# Patient Record
Sex: MALE | Race: WHITE | NOT HISPANIC OR LATINO | Employment: OTHER | URBAN - METROPOLITAN AREA
[De-identification: names, ages, dates, MRNs, and addresses within clinical notes are randomized per-mention and may not be internally consistent; named-entity substitution may affect disease eponyms.]

---

## 2024-10-08 ENCOUNTER — APPOINTMENT (OUTPATIENT)
Age: 84
End: 2024-10-08
Payer: MEDICARE

## 2024-10-08 ENCOUNTER — OFFICE VISIT (OUTPATIENT)
Age: 84
End: 2024-10-08
Payer: MEDICARE

## 2024-10-08 VITALS
WEIGHT: 180.8 LBS | HEIGHT: 72 IN | OXYGEN SATURATION: 96 % | DIASTOLIC BLOOD PRESSURE: 60 MMHG | TEMPERATURE: 98 F | BODY MASS INDEX: 24.49 KG/M2 | SYSTOLIC BLOOD PRESSURE: 112 MMHG | RESPIRATION RATE: 17 BRPM | HEART RATE: 73 BPM

## 2024-10-08 DIAGNOSIS — Z76.89 ENCOUNTER TO ESTABLISH CARE: Primary | ICD-10-CM

## 2024-10-08 DIAGNOSIS — M17.11 ARTHRITIS OF RIGHT KNEE: ICD-10-CM

## 2024-10-08 DIAGNOSIS — J44.9 CHRONIC OBSTRUCTIVE PULMONARY DISEASE, UNSPECIFIED COPD TYPE (HCC): ICD-10-CM

## 2024-10-08 DIAGNOSIS — L60.2 OVERGROWN TOENAILS: ICD-10-CM

## 2024-10-08 DIAGNOSIS — C61 PROSTATE CANCER (HCC): ICD-10-CM

## 2024-10-08 DIAGNOSIS — M51.362 DEGENERATION OF INTERVERTEBRAL DISC OF LUMBAR REGION WITH DISCOGENIC BACK PAIN AND LOWER EXTREMITY PAIN: ICD-10-CM

## 2024-10-08 DIAGNOSIS — R51.9 ACUTE NONINTRACTABLE HEADACHE, UNSPECIFIED HEADACHE TYPE: ICD-10-CM

## 2024-10-08 DIAGNOSIS — C19 COLORECTAL CANCER (HCC): ICD-10-CM

## 2024-10-08 DIAGNOSIS — I10 PRIMARY HYPERTENSION: ICD-10-CM

## 2024-10-08 DIAGNOSIS — R06.09 DYSPNEA ON EXERTION: ICD-10-CM

## 2024-10-08 DIAGNOSIS — Z23 ENCOUNTER FOR IMMUNIZATION: ICD-10-CM

## 2024-10-08 PROBLEM — Z15.09 BRCA1 GENE MUTATION POSITIVE: Status: ACTIVE | Noted: 2024-10-08

## 2024-10-08 PROBLEM — H35.30 MACULAR DEGENERATION: Status: ACTIVE | Noted: 2024-10-08

## 2024-10-08 PROBLEM — M81.0 OSTEOPOROSIS: Status: ACTIVE | Noted: 2024-10-08

## 2024-10-08 PROBLEM — Z90.49 HISTORY OF COLON RESECTION: Status: ACTIVE | Noted: 2024-10-08

## 2024-10-08 PROBLEM — E78.2 MIXED HYPERLIPIDEMIA: Status: ACTIVE | Noted: 2024-09-06

## 2024-10-08 PROBLEM — G47.33 OBSTRUCTIVE SLEEP APNEA SYNDROME: Status: ACTIVE | Noted: 2024-10-08

## 2024-10-08 PROBLEM — Z15.01 BRCA1 GENE MUTATION POSITIVE: Status: ACTIVE | Noted: 2024-10-08

## 2024-10-08 PROBLEM — C43.30 MALIGNANT MELANOMA OF FACE EXCLUDING EYELID, NOSE, LIP, AND EAR (HCC): Status: ACTIVE | Noted: 2024-10-08

## 2024-10-08 PROBLEM — K21.9 GASTROESOPHAGEAL REFLUX DISEASE: Status: ACTIVE | Noted: 2024-10-08

## 2024-10-08 PROCEDURE — 99204 OFFICE O/P NEW MOD 45 MIN: CPT | Performed by: STUDENT IN AN ORGANIZED HEALTH CARE EDUCATION/TRAINING PROGRAM

## 2024-10-08 PROCEDURE — 73562 X-RAY EXAM OF KNEE 3: CPT

## 2024-10-08 PROCEDURE — 90662 IIV NO PRSV INCREASED AG IM: CPT | Performed by: STUDENT IN AN ORGANIZED HEALTH CARE EDUCATION/TRAINING PROGRAM

## 2024-10-08 PROCEDURE — G0008 ADMIN INFLUENZA VIRUS VAC: HCPCS | Performed by: STUDENT IN AN ORGANIZED HEALTH CARE EDUCATION/TRAINING PROGRAM

## 2024-10-08 RX ORDER — LORATADINE 10 MG/1
10 TABLET ORAL
COMMUNITY
Start: 2024-05-16

## 2024-10-08 RX ORDER — GABAPENTIN 100 MG/1
300 CAPSULE ORAL
COMMUNITY
Start: 2024-08-06

## 2024-10-08 RX ORDER — AMLODIPINE BESYLATE 2.5 MG/1
2.5 TABLET ORAL DAILY
COMMUNITY

## 2024-10-08 RX ORDER — FLUTICASONE PROPIONATE 50 MCG
2 SPRAY, SUSPENSION (ML) NASAL DAILY
COMMUNITY

## 2024-10-08 RX ORDER — METOPROLOL SUCCINATE 25 MG/1
TABLET, EXTENDED RELEASE ORAL
COMMUNITY

## 2024-10-08 RX ORDER — ROSUVASTATIN CALCIUM 10 MG/1
10 TABLET, COATED ORAL
COMMUNITY
Start: 2024-05-16

## 2024-10-08 RX ORDER — ASPIRIN 81 MG/1
81 TABLET ORAL DAILY
COMMUNITY
Start: 2024-08-12

## 2024-10-08 RX ORDER — GUAIFENESIN 600 MG/1
1 TABLET, EXTENDED RELEASE ORAL 2 TIMES DAILY
COMMUNITY
Start: 2024-05-16

## 2024-10-08 RX ORDER — LOSARTAN POTASSIUM 25 MG/1
25 TABLET ORAL
COMMUNITY
Start: 2024-05-16

## 2024-10-08 NOTE — PROGRESS NOTES
Ambulatory Visit  Name: Dante Mckinney      : 1940      MRN: [id number]  Encounter Provider: Severino Hilton MD  Encounter Date: 10/8/2024   Encounter department: [facility] [place]    Assessment & Plan  Encounter to establish care  Immunizations: Influenza vaccine given today; RSV vaccine recommended  Labs: CMP for MRI w wo contrast; patient reports [facility] - he will send reports       Degeneration of intervertebral disc of lumbar region with discogenic back pain and lower extremity pain  Reviewed patient's MRI LS results that he brought with him. Was previously getting injections with Pain Medicine. Refer to Spine and Pain management.  Orders:    Ambulatory referral to Spine & Pain Management; Future    Acute nonintractable headache, unspecified headache type  New onset headache for the past week. Lower occipital headache that is waking him up at night. Given new onset headache in elderly waking him up at night, will obtain MRI brain. CMP to be done prior to MRI brain to check kidney function.  Orders:    MRI brain w wo contrast; Future    Comprehensive metabolic panel; Future    Chronic obstructive pulmonary disease, unspecified COPD type (HCC)  Patient uses O2 at night. He does not use inhalers. He is complaining of MASCORRO but declines even rescue inhaler.       Dyspnea on exertion  Given history of COPD, suspect most likely related. Offered patient inhalers, including rescue inhaler, but he declines as he states MASCORRO improves with rest. He had stress test done in [place] prior to moving. He was started on ASA and advised to see Cardiologist upon moving here. He saw [facility] Cardiologist who looked at stress test report and saw no ischemia. No comments made on whether he should continue ASA or discontinue. He has follow up in December. For now, will continue ASA and await Cardiology recommendations. Also on beta blocker and statin. Will continue for  now until patient sees Cardiology again.       Primary hypertension  BP well controlled. Continue amlodipine 2.5 mg qd, losartan 25 mg qd, Toprol XL 25 mg qd.       Arthritis of right knee  Chronic. Patient reports that he was told he would need surgery.  Obtain updated R knee XR  Continue tylenol and trial voltaren gel  Refer to Orthopedics    Orders:    Ambulatory Referral to Orthopedic Surgery; Future    XR knee 3 vw right non injury; Future    Diclofenac Sodium (VOLTAREN) 1 %; Apply 2 g topically 4 (four) times a day    Prostate cancer (HCC)  S/p partial resection. Stable.       Colorectal cancer (HCC)  S/p partial colon resection. Stable.       Overgrown toenails  Has some sores on the lateral aspect of the 4th toe of the left foot and medial aspect of the 5th toe as well. Patient reports he has not cut his toenails in years. Refer to Podiatry.    Orders:    Ambulatory Referral to Podiatry; Future    Encounter for immunization    Orders:    influenza vaccine, high-dose, PF 0.5 mL (Fluzone High Dose)      Depression Screening and Follow-up Plan: Patient was screened for depression during today's encounter. They screened negative with a PHQ-2 score of 0.      History of Present Illness     Dante Mckinney is an 85 yo M with PMH of colon cancer s/p resection, COPD, AURE on PCAP and O2, osteoarthritis, prostate cancer s/p partial resection, bilateral macular degeneration, and HTN who presents today to establish care. Patient recently moved from [place]. [facility] Patient reports having labs done just last week and will get report sent to us. He also comes with some previous office visit/procedure notes and results of MRI of lumbar spine, which were reviewed today. He has history of lumbar spine degenerative disease for which he was following with Pain Medicine and got injections in the past. Also has history of bilateral knee OA. Has had knee surgery on the left, but not yet on the right. He uses  "CPAP and O2 at night. He reports he gets MASCORRO. Prior to leaving [place], he was sent for stress test. He was started on aspirin at that time. He saw [facility] Cardiologist last month who read the stress test report as having no ischemia. He was not told to d/c ASA but has follow up in December. They felt maybe his symptoms were due to lung disease. Patient declines need for inhaler at this time. Over the last week, he has noticed lower occipital headache, aching pain with associated pain in the neck. It can be one or both sides. He also reports a \"wooshing\" sound in his right ear. The pain has been waking him up at night. No other focal neuro deficit other than numbness in his right lower leg that is chronic and associated with his knee OA. He denies blurry vision.          Review of Systems   Constitutional:  Negative for appetite change, chills and fever.   HENT:  Negative for congestion and sore throat.    Eyes:  Negative for visual disturbance.   Respiratory:  Negative for cough and shortness of breath.         Dyspnea on exertion   Cardiovascular:  Negative for chest pain and leg swelling.   Gastrointestinal:  Negative for abdominal pain, constipation, diarrhea and nausea.   Genitourinary:  Negative for difficulty urinating and dysuria.   Musculoskeletal:  Positive for arthralgias, gait problem and neck pain. Negative for myalgias.   Skin:  Negative for rash.   Neurological:  Positive for numbness and headaches. Negative for dizziness, weakness and light-headedness.   Psychiatric/Behavioral:  Positive for sleep disturbance. Negative for confusion.      Medical History Reviewed by provider this encounter:  Meds       Past Medical History   Past Medical History:   Diagnosis Date    Colorectal cancer (HCC)     Macular degeneration     both eyes    Melanoma in situ of cheek (HCC)      Past Surgical History:   Procedure Laterality Date    CATARACT EXTRACTION Bilateral     2015    COLONOSCOPY      LAPAROSCOPIC COLON " RESECTION      PROSTATE BIOPSY      2009 and 1994    REPLACEMENT TOTAL KNEE Left     2009    REPLACEMENT TOTAL KNEE Right     2016    TOTAL HIP ARTHROPLASTY      2021     Family History   Problem Relation Age of Onset    Cancer Mother     Heart disease Father     Hodgkin's lymphoma Brother     Colorectal Cancer Maternal Grandmother      Current Outpatient Medications on File Prior to Visit   Medication Sig Dispense Refill    amLODIPine (NORVASC) 2.5 mg tablet Take 2.5 mg by mouth daily      aspirin (ECOTRIN LOW STRENGTH) 81 mg EC tablet Take 81 mg by mouth daily      Calcium Carbonate Antacid 400 MG CHEW Chew      Cholecalciferol 100 MCG (4000 UT) TABS Take 2 tablets by mouth daily      fluticasone (FLONASE) 50 mcg/act nasal spray 2 sprays into each nostril daily      gabapentin (NEURONTIN) 100 mg capsule Take 300 mg by mouth daily at bedtime      guaiFENesin (MUCINEX) 600 mg 12 hr tablet Take 1 tablet by mouth 2 (two) times a day      loratadine (CLARITIN) 10 mg tablet Take 10 mg by mouth      losartan (COZAAR) 25 mg tablet Take 25 mg by mouth      metoprolol succinate (TOPROL-XL) 25 mg 24 hr tablet take 1/2 (one-half) tablet by mouth once daily      rosuvastatin (CRESTOR) 10 MG tablet Take 10 mg by mouth       No current facility-administered medications on file prior to visit.     Allergies   Allergen Reactions    Balance B-50 Dizziness    Omeprazole Other (See Comments)    Prednisone Other (See Comments)    Simvastatin Myalgia      Current Outpatient Medications on File Prior to Visit   Medication Sig Dispense Refill    amLODIPine (NORVASC) 2.5 mg tablet Take 2.5 mg by mouth daily      aspirin (ECOTRIN LOW STRENGTH) 81 mg EC tablet Take 81 mg by mouth daily      Calcium Carbonate Antacid 400 MG CHEW Chew      Cholecalciferol 100 MCG (4000 UT) TABS Take 2 tablets by mouth daily      fluticasone (FLONASE) 50 mcg/act nasal spray 2 sprays into each nostril daily      gabapentin (NEURONTIN) 100 mg capsule Take 300 mg  by mouth daily at bedtime      guaiFENesin (MUCINEX) 600 mg 12 hr tablet Take 1 tablet by mouth 2 (two) times a day      loratadine (CLARITIN) 10 mg tablet Take 10 mg by mouth      losartan (COZAAR) 25 mg tablet Take 25 mg by mouth      metoprolol succinate (TOPROL-XL) 25 mg 24 hr tablet take 1/2 (one-half) tablet by mouth once daily      rosuvastatin (CRESTOR) 10 MG tablet Take 10 mg by mouth       No current facility-administered medications on file prior to visit.      Social History     Tobacco Use    Smoking status: Former     Current packs/day: 0.00     Types: Cigarettes     Quit date: 10/8/1994     Years since quittin.0     Passive exposure: Never    Smokeless tobacco: Never   Vaping Use    Vaping status: Never Used   Substance and Sexual Activity    Alcohol use: Yes     Alcohol/week: 3.0 standard drinks of alcohol     Types: 3 Glasses of wine per week    Drug use: Not Currently    Sexual activity: Not Currently         Objective     /60 (BP Location: Right arm, Patient Position: Sitting, Cuff Size: Standard)   Pulse 73   Temp 98 °F (36.7 °C) (Tympanic)   Resp 17   Ht 6' (1.829 m)   Wt 82 kg (180 lb 12.8 oz)   SpO2 96%   BMI 24.52 kg/m²     Physical Exam  Constitutional:       General: He is not in acute distress.  HENT:      Right Ear: Tympanic membrane, ear canal and external ear normal.      Left Ear: Tympanic membrane, ear canal and external ear normal.      Ears:      Comments: Cerumen in left ear  Eyes:      Extraocular Movements: Extraocular movements intact.      Right eye: No nystagmus.      Left eye: No nystagmus.      Conjunctiva/sclera: Conjunctivae normal.   Cardiovascular:      Rate and Rhythm: Normal rate and regular rhythm.      Heart sounds: Normal heart sounds.   Pulmonary:      Effort: Pulmonary effort is normal.      Breath sounds: Normal breath sounds.   Abdominal:      General: There is no distension.      Tenderness: There is no abdominal tenderness.   Musculoskeletal:       Cervical back: Neck supple. No rigidity. No spinous process tenderness or muscular tenderness.      Right lower leg: No edema.      Left lower leg: No edema.   Feet:      Comments: Overgrown toenails seen on left foot exam; also with some sores on the sides of his 4th and 5th toes where they rub together  Skin:     General: Skin is warm and dry.      Comments: Seborrheic keratoses on bilateral forearms   Neurological:      Mental Status: He is alert.      Cranial Nerves: Cranial nerves 2-12 are intact.      Sensory: Sensation is intact.      Motor: Motor function is intact.      Coordination: Romberg sign positive.      Gait: Gait abnormal.   Psychiatric:         Speech: Speech normal.         Behavior: Behavior normal. Behavior is cooperative.

## 2024-10-08 NOTE — ASSESSMENT & PLAN NOTE
Patient uses O2 at night. He does not use inhalers. He is complaining of MASCORRO but declines even rescue inhaler.

## 2024-10-15 ENCOUNTER — TELEPHONE (OUTPATIENT)
Age: 84
End: 2024-10-15

## 2024-10-15 NOTE — TELEPHONE ENCOUNTER
----- Message from Severino Hilton MD sent at 10/15/2024 12:43 PM EDT -----  Please let patient know that his knee XR shows advanced arthritis as well as small amount of fluid in joint. He has appt with Ortho in 2 days

## 2024-10-17 ENCOUNTER — OFFICE VISIT (OUTPATIENT)
Dept: OBGYN CLINIC | Facility: CLINIC | Age: 84
End: 2024-10-17
Payer: MEDICARE

## 2024-10-17 VITALS
HEIGHT: 72 IN | HEART RATE: 83 BPM | WEIGHT: 187 LBS | BODY MASS INDEX: 25.33 KG/M2 | SYSTOLIC BLOOD PRESSURE: 116 MMHG | DIASTOLIC BLOOD PRESSURE: 67 MMHG

## 2024-10-17 DIAGNOSIS — E78.2 MIXED HYPERLIPIDEMIA: Primary | ICD-10-CM

## 2024-10-17 DIAGNOSIS — M17.11 ARTHRITIS OF RIGHT KNEE: ICD-10-CM

## 2024-10-17 PROCEDURE — 20610 DRAIN/INJ JOINT/BURSA W/O US: CPT | Performed by: ORTHOPAEDIC SURGERY

## 2024-10-17 PROCEDURE — 99203 OFFICE O/P NEW LOW 30 MIN: CPT | Performed by: ORTHOPAEDIC SURGERY

## 2024-10-17 RX ADMIN — BUPIVACAINE HYDROCHLORIDE 2 ML: 2.5 INJECTION, SOLUTION INFILTRATION; PERINEURAL at 14:30

## 2024-10-17 RX ADMIN — KETOROLAC TROMETHAMINE 60 MG: 30 INJECTION, SOLUTION INTRAMUSCULAR; INTRAVENOUS at 14:30

## 2024-10-17 NOTE — PROGRESS NOTES
Orthopaedics Office Visit - 1st Patient Visit    ASSESSMENT/PLAN:    Assessment:   Right knee severe osteoarthritis - chronic, exacerbated in recent months/year      Plan:   Discussed conservative treatment with patient at length  Weight bearing as tolerated right lower extremity   Begin to maintain hinged knee for support   Offered patient Toradol injection. Patient accepted and tolerated well.    Physician guided home exercise program provided for patient.   Over the counter analgesics as needed / directed   Ice / heat as directed   Follow up 8 weeks       To Do Next Visit:  Evaluate right knee pain     ____________________________________  CHIEF COMPLAINT:  Chief Complaint   Patient presents with    Right Knee - Pain     Referred by PCP. Xray in chart from 10/08/24. Symptoms for quite a while. Denies any recent falls, injuries, etc. Patient received a cortisone injection back in May, and notes minimal relief.         SUBJECTIVE:  Dante Mckinney is a 84 y.o. male who presents to the office for an initial evaluation for her right knee.  Patient states that he has been experiencing knee pain ongoing for the past few decades in duration.  Patient notes that over the past few months he has had increased pain with no trauma.  Patient states that his pain is generally constant in nature and becomes worse with standing from seated position, range of motion of the knee and weightbearing.  Patient has had cortisone injections in the past, most recently being in May.  Patient has had viscosupplementation injections in the past which have provided minimal relief of symptoms.  Patient states that he has not been icing or heating the knee.  Patient denies any instability in the knee.  Patient offers no other complaints at this time      PAST MEDICAL HISTORY:  Past Medical History:   Diagnosis Date    Cancer (HCC)     Colorectal cancer (HCC)     Macular degeneration     both eyes    Melanoma in situ of cheek (HCC)      Osteoarthritis        PAST SURGICAL HISTORY:  Past Surgical History:   Procedure Laterality Date    CATARACT EXTRACTION Bilateral     2015    COLONOSCOPY      JOINT REPLACEMENT  2009    LAPAROSCOPIC COLON RESECTION      PROSTATE BIOPSY       and     REPLACEMENT TOTAL KNEE Left     2009    REPLACEMENT TOTAL KNEE Right     2016    TOTAL HIP ARTHROPLASTY             FAMILY HISTORY:  Family History   Problem Relation Age of Onset    Cancer Mother     Heart disease Father     Hodgkin's lymphoma Brother     Cancer Brother     Colorectal Cancer Maternal Grandmother     Cancer Sister         Breast & Colon       SOCIAL HISTORY:  Social History     Tobacco Use    Smoking status: Former     Current packs/day: 0.00     Average packs/day: 0.5 packs/day for 30.7 years (15.4 ttl pk-yrs)     Types: Cigarettes     Start date: 1964     Quit date: 10/8/1994     Years since quittin.0     Passive exposure: Never    Smokeless tobacco: Never   Vaping Use    Vaping status: Never Used   Substance Use Topics    Alcohol use: Yes     Alcohol/week: 40.0 standard drinks of alcohol     Types: 40 Cans of beer per week    Drug use: Never       MEDICATIONS:    Current Outpatient Medications:     amLODIPine (NORVASC) 2.5 mg tablet, Take 2.5 mg by mouth daily, Disp: , Rfl:     aspirin (ECOTRIN LOW STRENGTH) 81 mg EC tablet, Take 81 mg by mouth daily, Disp: , Rfl:     Calcium Carbonate Antacid 400 MG CHEW, Chew, Disp: , Rfl:     Cholecalciferol 100 MCG (4000 UT) TABS, Take 2 tablets by mouth daily, Disp: , Rfl:     Diclofenac Sodium (VOLTAREN) 1 %, Apply 2 g topically 4 (four) times a day, Disp: 50 g, Rfl: 1    fluticasone (FLONASE) 50 mcg/act nasal spray, 2 sprays into each nostril daily, Disp: , Rfl:     gabapentin (NEURONTIN) 100 mg capsule, Take 300 mg by mouth daily at bedtime, Disp: , Rfl:     guaiFENesin (MUCINEX) 600 mg 12 hr tablet, Take 1 tablet by mouth 2 (two) times a day, Disp: , Rfl:     loratadine (CLARITIN) 10  "mg tablet, Take 10 mg by mouth, Disp: , Rfl:     losartan (COZAAR) 25 mg tablet, Take 25 mg by mouth, Disp: , Rfl:     metoprolol succinate (TOPROL-XL) 25 mg 24 hr tablet, take 1/2 (one-half) tablet by mouth once daily, Disp: , Rfl:     rosuvastatin (CRESTOR) 10 MG tablet, Take 10 mg by mouth, Disp: , Rfl:     ALLERGIES:  Allergies   Allergen Reactions    Balance B-50 Dizziness    Omeprazole Other (See Comments)    Prednisone Other (See Comments)    Simvastatin Myalgia       LABS:  HgA1c: No results found for: \"HGBA1C\"  BMP: No results found for: \"GLUCOSE\", \"CALCIUM\", \"NA\", \"K\", \"CO2\", \"CL\", \"BUN\", \"CREATININE\"  CBC: No components found for: \"CBC\"    _____________________________________________________  PHYSICAL EXAMINATION:  Vital signs: Ht 6' (1.829 m)   Wt 84.8 kg (187 lb)   BMI 25.36 kg/m²   General: No acute distress, awake and alert  Psychiatric: Mood and affect appear appropriate  HEENT: Trachea Midline, No torticollis, no apparent facial trauma  Cardiovascular: No audible murmurs; Extremities appear perfused  Pulmonary: No audible wheezing or stridor  Skin: No open lesions; see further details (if any) below      MUSCULOSKELETAL EXAMINATION:  Right knee examination :  Patient sitting comfortably in the office in no apparent distress   No acute visible abnormalities present in the right knee.  Extremity appears well-perfused overall.  Tenderness to palpation noted over the medial joint line.  No other bony or soft tissue tenderness to palpation noted at this time  0 to 100 degrees of range of motion of the knee appreciated with peripatellar crepitus noted  NV intact    _____________________________________________________  STUDIES REVIEWED:  I personally reviewed the images obtained ON 10/8/24 and my independent interpretation is as follows:  Right knee XR 3 views :  IMPRESSION:  No acute osseous abnormality.  Advanced degenerative changes of the knee, most prominent in the medial " "compartment.        PROCEDURES PERFORMED:  Large joint arthrocentesis: R knee  [place] Protocol:  procedure performed by consultantConsent: Verbal consent obtained.  Risks and benefits: risks, benefits and alternatives were discussed  Consent given by: patient  Patient understanding: patient states understanding of the procedure being performed  Site marked: the operative site was marked  Patient identity confirmed: verbally with patient  Supporting Documentation  Indications: pain   Procedure Details  Location: knee - R knee  Preparation: Patient was prepped and draped in the usual sterile fashion  Needle size: 22 G  Ultrasound guidance: no  Approach: anterolateral  Medications administered: 2 mL bupivacaine 0.25 %; 60 mg ketorolac 60 mg/2 mL    Patient tolerance: patient tolerated the procedure well with no immediate complications  Dressing:  Sterile dressing applied                             Willis Foster PA-C - assisting  Pedro Beavers MD                                  Portions of the record may have been created with voice recognition software.  Occasional wrong word or \"sound a like\" substitutions may have occurred due to the inherent limitations of voice recognition software.  Read the chart carefully and recognize, using context, where substitutions have occurred.    "

## 2024-10-17 NOTE — PATIENT INSTRUCTIONS
Discussed conservative treatment with patient at length  Weight bearing as tolerated right lower extremity   Begin to maintain hinged knee for support   Offered patient Toradol injection. Patient accepted and tolerated well.    Physician guided home exercise program provided for patient.   Over the counter analgesics as needed / directed   Ice / heat as directed   Follow up 8 weeks

## 2024-10-18 RX ORDER — KETOROLAC TROMETHAMINE 30 MG/ML
60 INJECTION, SOLUTION INTRAMUSCULAR; INTRAVENOUS
Status: COMPLETED | OUTPATIENT
Start: 2024-10-17 | End: 2024-10-17

## 2024-10-18 RX ORDER — BUPIVACAINE HYDROCHLORIDE 2.5 MG/ML
2 INJECTION, SOLUTION INFILTRATION; PERINEURAL
Status: COMPLETED | OUTPATIENT
Start: 2024-10-17 | End: 2024-10-17

## 2024-11-02 ENCOUNTER — HOSPITAL ENCOUNTER (OUTPATIENT)
Dept: MRI IMAGING | Facility: HOSPITAL | Age: 84
Discharge: HOME | End: 2024-11-02
Attending: STUDENT IN AN ORGANIZED HEALTH CARE EDUCATION/TRAINING PROGRAM
Payer: MEDICARE

## 2024-11-02 DIAGNOSIS — R51.9 ACUTE NONINTRACTABLE HEADACHE, UNSPECIFIED HEADACHE TYPE: ICD-10-CM

## 2024-11-02 PROCEDURE — A9585 GADOBUTROL INJECTION: HCPCS | Performed by: STUDENT IN AN ORGANIZED HEALTH CARE EDUCATION/TRAINING PROGRAM

## 2024-11-02 PROCEDURE — 70553 MRI BRAIN STEM W/O & W/DYE: CPT

## 2024-11-02 RX ORDER — GADOBUTROL 604.72 MG/ML
8 INJECTION INTRAVENOUS
Status: COMPLETED | OUTPATIENT
Start: 2024-11-02 | End: 2024-11-02

## 2024-11-02 RX ADMIN — GADOBUTROL 8 ML: 604.72 INJECTION INTRAVENOUS at 10:32

## 2024-11-04 ENCOUNTER — TELEPHONE (OUTPATIENT)
Age: 84
End: 2024-11-04

## 2024-11-04 NOTE — TELEPHONE ENCOUNTER
----- Message from Severino Hilton MD sent at 11/4/2024  3:43 PM EST -----  Please let patient know that his MRI shows some mild chronic vascular disease as well as likely a small microbleed that may have happened in the past. No major findings to suggest why he is having the headaches. How are his headaches?

## 2024-11-04 NOTE — TELEPHONE ENCOUNTER
Can treat with tylenol PRN for now and watch out for any focal neurologic symptoms like changes in vision, weakness in particular part of the body, slurred speech, or imbalance

## 2024-11-04 NOTE — TELEPHONE ENCOUNTER
Asked questions to patient as per provider message. Patient expressed understanding and had the following response: Patient states his headaches are mild and comes and goes.   Please advise.

## 2024-11-08 ENCOUNTER — TELEPHONE (OUTPATIENT)
Dept: OBGYN CLINIC | Facility: CLINIC | Age: 84
End: 2024-11-08

## 2024-11-08 NOTE — PROGRESS NOTES
Assessment:  1. Spinal stenosis of lumbar region, unspecified whether neurogenic claudication present    2. Lumbar radiculopathy    3. Lumbar spondylosis    4. Right foot drop    5. Sacroiliitis (HCC)        Plan:  Orders Placed This Encounter   Procedures    Durable Medical Equipment     RIGHT FOOT MAFO for drop foot    FL spine and pain procedure     Standing Status:   Future     Standing Expiration Date:   11/11/2028     Order Specific Question:   Reason for Exam:     Answer:   right SI joint injection - OK TO DB     Order Specific Question:   Anticoagulant hold needed?     Answer:   no       No orders of the defined types were placed in this encounter.      My impressions and treatment recommendations were discussed in detail with the patient, who verbalized understanding and had no further questions.    84-year-old male presents her office chief complaint of right lower back pain as well as right leg pain below the knee.  Pain does not run continuously from the back down the entire right lower extremity.  Pain does not also appear to be subjectively linked between the back and the leg.      Exam notable for tenderness to palpation over the right SI joint with pain with provocative maneuvers as noted below.  He may have component of sacroiliitis and we discussed right SI joint injection under fluoroscopic guidance as a diagnostic and potentially therapeutic measure.      Differential diagnosis for his right leg pain includes peroneal neuropathy versus lumbar radiculopathy.  He has notable foot drop on the right which she reports is a chronic issue.  He does have MRI report of severe L5 foraminal stenosis on the right.  However he is not complaining of very clear L5 radicular pattern to his pain.  Therefore differential diagnosis is includes peroneal neuropathy.  I am going to order MAFO for the right foot.    If SI joint injection is ineffective, then may consider TFESI under fluoroscopic guidance. He reports  having back injection 10 years ago        [place] Prescription Drug Monitoring Program report was reviewed and was appropriate     Complete risks and benefits including bleeding, infection, tissue reaction, nerve injury and allergic reaction were discussed. The approach was demonstrated using models and literature was provided. Verbal and written consent was obtained.     Discharge instructions were provided. I personally saw and examined the patient and I agree with the above discussed plan of care.    History of Present Illness:    Dante Mckinney is a 84 y.o. male who presents to [facility] for initial evaluation of the above stated pain complaints. The patient has a past medical and chronic pain history as outlined in the assessment section. He was referred by Severino Hilton MD  [address] [place] [address] [place] [id number] .    Is here with chief complaint of back and right leg pain for 6 months.  Undetermined cause.  Moderate to severe over the past month.  He has considerable pain with activity.  Pain is intermittent.  Shooting, numbness, sharp in nature.    The pain is increased with lying down, standing, walking, exercise.  Decreased with sitting.    He is new to the area.  He is here from [place].  He has MRI report from 8/14/2024.    No tobacco or marijuana use.  Not allergic to latex or contrast dye.    In the past oxycodone and tramadol have provided relief.  Currently using acetaminophen with relief.    He reports pain primarily in the Right SI region and sometimes numb in the area. He then has intermittent pain in the right leg whic his shooting and lasts for seconds at a time. It is usually felt below the knee and it is hard to pinpoint exactly which part of the leg is affected.     Review of Systems:    Review of Systems   Eyes:  Positive for visual disturbance.   Respiratory:  Positive for shortness of breath.    Cardiovascular:  Positive for leg swelling.    Musculoskeletal:  Positive for arthralgias.   Neurological:  Positive for numbness.           Past Medical History:   Diagnosis Date    Cancer (HCC)     Colorectal cancer (HCC)     Macular degeneration     both eyes    Melanoma in situ of cheek (HCC)     Osteoarthritis        Past Surgical History:   Procedure Laterality Date    CATARACT EXTRACTION Bilateral     2015    COLONOSCOPY      JOINT REPLACEMENT  2009    LAPAROSCOPIC COLON RESECTION      PROSTATE BIOPSY       and     REPLACEMENT TOTAL KNEE Left     2009    REPLACEMENT TOTAL KNEE Right     2016    TOTAL HIP ARTHROPLASTY             Family History   Problem Relation Age of Onset    Cancer Mother     Heart disease Father     Hodgkin's lymphoma Brother     Cancer Brother     Colorectal Cancer Maternal Grandmother     Cancer Sister         Breast & Colon       Social History     Occupational History    Not on file   Tobacco Use    Smoking status: Former     Current packs/day: 0.00     Average packs/day: 0.5 packs/day for 30.7 years (15.4 ttl pk-yrs)     Types: Cigarettes     Start date: 1964     Quit date: 10/8/1994     Years since quittin.1     Passive exposure: Never    Smokeless tobacco: Never   Vaping Use    Vaping status: Never Used   Substance and Sexual Activity    Alcohol use: Yes     Alcohol/week: 40.0 standard drinks of alcohol     Types: 40 Cans of beer per week    Drug use: Never    Sexual activity: Not Currently     Partners: Female     Birth control/protection: Male Sterilization         Current Outpatient Medications:     amLODIPine (NORVASC) 2.5 mg tablet, Take 2.5 mg by mouth daily, Disp: , Rfl:     aspirin (ECOTRIN LOW STRENGTH) 81 mg EC tablet, Take 81 mg by mouth daily, Disp: , Rfl:     Calcium Carbonate Antacid 400 MG CHEW, Chew, Disp: , Rfl:     Cholecalciferol 100 MCG (4000 UT) TABS, Take 2 tablets by mouth daily, Disp: , Rfl:     Diclofenac Sodium (VOLTAREN) 1 %, Apply 2 g topically 4 (four) times a day, Disp:  50 g, Rfl: 1    fluticasone (FLONASE) 50 mcg/act nasal spray, 2 sprays into each nostril daily, Disp: , Rfl:     gabapentin (NEURONTIN) 100 mg capsule, Take 300 mg by mouth daily at bedtime, Disp: , Rfl:     guaiFENesin (MUCINEX) 600 mg 12 hr tablet, Take 1 tablet by mouth 2 (two) times a day, Disp: , Rfl:     loratadine (CLARITIN) 10 mg tablet, Take 10 mg by mouth, Disp: , Rfl:     losartan (COZAAR) 25 mg tablet, Take 25 mg by mouth, Disp: , Rfl:     metoprolol succinate (TOPROL-XL) 25 mg 24 hr tablet, take 1/2 (one-half) tablet by mouth once daily, Disp: , Rfl:     rosuvastatin (CRESTOR) 10 MG tablet, Take 10 mg by mouth, Disp: , Rfl:     Allergies   Allergen Reactions    Balance B-50 Dizziness    Omeprazole Other (See Comments)    Prednisone Other (See Comments)    Simvastatin Myalgia       Physical Exam:    /65   Pulse 87   Ht 6' (1.829 m)   Wt 84.5 kg (186 lb 3.2 oz)   BMI 25.25 kg/m²     Constitutional: normal, well developed, well nourished, alert, in no distress and non-toxic and no overt pain behavior.  Eyes: anicteric  HEENT: grossly intact  Neck: supple, symmetric, trachea midline and no masses   Pulmonary:even and unlabored  Cardiovascular:No edema or pitting edema present  Skin:Normal without rashes or lesions and well hydrated  Psychiatric:Mood and affect appropriate  Neurologic:Cranial Nerves II-XII grossly intact  Musculoskeletal:normal    Lumbar Spine Exam    Appearance:  Normal lordosis  Palpation/Tenderness:  right sacroiliac joint tenderness  Sensory:  no sensory deficits noted  Motor Strength:  Left hip flexion:  5/5  Left hip extension:  5/5  Right hip flexion:  5/5  Right hip extension:  5/5  Left knee flexion:  5/5  Left knee extension:  5/5  Right knee flexion:  5/5  Right knee extension:  5/5  Left foot dorsiflexion:  5/5  Left foot plantar flexion:  5/5  Right foot dorsiflexion:  3/5  Right foot plantar flexion:  5/5  Reflexes:  Left Patellar:  1+   Right Patellar:  1+   Left  Achilles:  1+   Right Achilles:  1+   Special Tests:  Left Straight Leg Test:  negative  Right Straight Leg Test:  positive  Right Samuel's Maneuver:  positive  Right Gaenslen's Test:  positive  Right Pelvic Distraction Test:  positive      Imaging  FL spine and pain procedure    (Results Pending)       Orders Placed This Encounter   Procedures    Durable Medical Equipment    FL spine and pain procedure

## 2024-11-08 NOTE — H&P (VIEW-ONLY)
Assessment:  1. Spinal stenosis of lumbar region, unspecified whether neurogenic claudication present    2. Lumbar radiculopathy    3. Lumbar spondylosis    4. Right foot drop    5. Sacroiliitis (HCC)        Plan:  Orders Placed This Encounter   Procedures    Durable Medical Equipment     RIGHT FOOT MAFO for drop foot    FL spine and pain procedure     Standing Status:   Future     Standing Expiration Date:   11/11/2028     Order Specific Question:   Reason for Exam:     Answer:   right SI joint injection - OK TO DB     Order Specific Question:   Anticoagulant hold needed?     Answer:   no       No orders of the defined types were placed in this encounter.      My impressions and treatment recommendations were discussed in detail with the patient, who verbalized understanding and had no further questions.    84-year-old male presents her office chief complaint of right lower back pain as well as right leg pain below the knee.  Pain does not run continuously from the back down the entire right lower extremity.  Pain does not also appear to be subjectively linked between the back and the leg.      Exam notable for tenderness to palpation over the right SI joint with pain with provocative maneuvers as noted below.  He may have component of sacroiliitis and we discussed right SI joint injection under fluoroscopic guidance as a diagnostic and potentially therapeutic measure.      Differential diagnosis for his right leg pain includes peroneal neuropathy versus lumbar radiculopathy.  He has notable foot drop on the right which she reports is a chronic issue.  He does have MRI report of severe L5 foraminal stenosis on the right.  However he is not complaining of very clear L5 radicular pattern to his pain.  Therefore differential diagnosis is includes peroneal neuropathy.  I am going to order MAFO for the right foot.    If SI joint injection is ineffective, then may consider TFESI under fluoroscopic guidance. He reports  having back injection 10 years ago        [place] Prescription Drug Monitoring Program report was reviewed and was appropriate     Complete risks and benefits including bleeding, infection, tissue reaction, nerve injury and allergic reaction were discussed. The approach was demonstrated using models and literature was provided. Verbal and written consent was obtained.     Discharge instructions were provided. I personally saw and examined the patient and I agree with the above discussed plan of care.    History of Present Illness:    Dante Mckinney is a 84 y.o. male who presents to [facility] for initial evaluation of the above stated pain complaints. The patient has a past medical and chronic pain history as outlined in the assessment section. He was referred by Severino Hilton MD  [address] [place] [address] [place] [id number] .    Is here with chief complaint of back and right leg pain for 6 months.  Undetermined cause.  Moderate to severe over the past month.  He has considerable pain with activity.  Pain is intermittent.  Shooting, numbness, sharp in nature.    The pain is increased with lying down, standing, walking, exercise.  Decreased with sitting.    He is new to the area.  He is here from [place].  He has MRI report from 8/14/2024.    No tobacco or marijuana use.  Not allergic to latex or contrast dye.    In the past oxycodone and tramadol have provided relief.  Currently using acetaminophen with relief.    He reports pain primarily in the Right SI region and sometimes numb in the area. He then has intermittent pain in the right leg whic his shooting and lasts for seconds at a time. It is usually felt below the knee and it is hard to pinpoint exactly which part of the leg is affected.     Review of Systems:    Review of Systems   Eyes:  Positive for visual disturbance.   Respiratory:  Positive for shortness of breath.    Cardiovascular:  Positive for leg swelling.    Musculoskeletal:  Positive for arthralgias.   Neurological:  Positive for numbness.           Past Medical History:   Diagnosis Date    Cancer (HCC)     Colorectal cancer (HCC)     Macular degeneration     both eyes    Melanoma in situ of cheek (HCC)     Osteoarthritis        Past Surgical History:   Procedure Laterality Date    CATARACT EXTRACTION Bilateral     2015    COLONOSCOPY      JOINT REPLACEMENT  2009    LAPAROSCOPIC COLON RESECTION      PROSTATE BIOPSY       and     REPLACEMENT TOTAL KNEE Left     2009    REPLACEMENT TOTAL KNEE Right     2016    TOTAL HIP ARTHROPLASTY             Family History   Problem Relation Age of Onset    Cancer Mother     Heart disease Father     Hodgkin's lymphoma Brother     Cancer Brother     Colorectal Cancer Maternal Grandmother     Cancer Sister         Breast & Colon       Social History     Occupational History    Not on file   Tobacco Use    Smoking status: Former     Current packs/day: 0.00     Average packs/day: 0.5 packs/day for 30.7 years (15.4 ttl pk-yrs)     Types: Cigarettes     Start date: 1964     Quit date: 10/8/1994     Years since quittin.1     Passive exposure: Never    Smokeless tobacco: Never   Vaping Use    Vaping status: Never Used   Substance and Sexual Activity    Alcohol use: Yes     Alcohol/week: 40.0 standard drinks of alcohol     Types: 40 Cans of beer per week    Drug use: Never    Sexual activity: Not Currently     Partners: Female     Birth control/protection: Male Sterilization         Current Outpatient Medications:     amLODIPine (NORVASC) 2.5 mg tablet, Take 2.5 mg by mouth daily, Disp: , Rfl:     aspirin (ECOTRIN LOW STRENGTH) 81 mg EC tablet, Take 81 mg by mouth daily, Disp: , Rfl:     Calcium Carbonate Antacid 400 MG CHEW, Chew, Disp: , Rfl:     Cholecalciferol 100 MCG (4000 UT) TABS, Take 2 tablets by mouth daily, Disp: , Rfl:     Diclofenac Sodium (VOLTAREN) 1 %, Apply 2 g topically 4 (four) times a day, Disp:  50 g, Rfl: 1    fluticasone (FLONASE) 50 mcg/act nasal spray, 2 sprays into each nostril daily, Disp: , Rfl:     gabapentin (NEURONTIN) 100 mg capsule, Take 300 mg by mouth daily at bedtime, Disp: , Rfl:     guaiFENesin (MUCINEX) 600 mg 12 hr tablet, Take 1 tablet by mouth 2 (two) times a day, Disp: , Rfl:     loratadine (CLARITIN) 10 mg tablet, Take 10 mg by mouth, Disp: , Rfl:     losartan (COZAAR) 25 mg tablet, Take 25 mg by mouth, Disp: , Rfl:     metoprolol succinate (TOPROL-XL) 25 mg 24 hr tablet, take 1/2 (one-half) tablet by mouth once daily, Disp: , Rfl:     rosuvastatin (CRESTOR) 10 MG tablet, Take 10 mg by mouth, Disp: , Rfl:     Allergies   Allergen Reactions    Balance B-50 Dizziness    Omeprazole Other (See Comments)    Prednisone Other (See Comments)    Simvastatin Myalgia       Physical Exam:    /65   Pulse 87   Ht 6' (1.829 m)   Wt 84.5 kg (186 lb 3.2 oz)   BMI 25.25 kg/m²     Constitutional: normal, well developed, well nourished, alert, in no distress and non-toxic and no overt pain behavior.  Eyes: anicteric  HEENT: grossly intact  Neck: supple, symmetric, trachea midline and no masses   Pulmonary:even and unlabored  Cardiovascular:No edema or pitting edema present  Skin:Normal without rashes or lesions and well hydrated  Psychiatric:Mood and affect appropriate  Neurologic:Cranial Nerves II-XII grossly intact  Musculoskeletal:normal    Lumbar Spine Exam    Appearance:  Normal lordosis  Palpation/Tenderness:  right sacroiliac joint tenderness  Sensory:  no sensory deficits noted  Motor Strength:  Left hip flexion:  5/5  Left hip extension:  5/5  Right hip flexion:  5/5  Right hip extension:  5/5  Left knee flexion:  5/5  Left knee extension:  5/5  Right knee flexion:  5/5  Right knee extension:  5/5  Left foot dorsiflexion:  5/5  Left foot plantar flexion:  5/5  Right foot dorsiflexion:  3/5  Right foot plantar flexion:  5/5  Reflexes:  Left Patellar:  1+   Right Patellar:  1+   Left  Achilles:  1+   Right Achilles:  1+   Special Tests:  Left Straight Leg Test:  negative  Right Straight Leg Test:  positive  Right Samuel's Maneuver:  positive  Right Gaenslen's Test:  positive  Right Pelvic Distraction Test:  positive      Imaging  FL spine and pain procedure    (Results Pending)       Orders Placed This Encounter   Procedures    Durable Medical Equipment    FL spine and pain procedure

## 2024-11-11 ENCOUNTER — DOCUMENTATION (OUTPATIENT)
Dept: PAIN MEDICINE | Facility: CLINIC | Age: 84
End: 2024-11-11

## 2024-11-11 ENCOUNTER — PATIENT MESSAGE (OUTPATIENT)
Dept: RADIOLOGY | Facility: CLINIC | Age: 84
End: 2024-11-11

## 2024-11-11 ENCOUNTER — CONSULT (OUTPATIENT)
Dept: PAIN MEDICINE | Facility: CLINIC | Age: 84
End: 2024-11-11
Payer: MEDICARE

## 2024-11-11 VITALS
HEIGHT: 72 IN | SYSTOLIC BLOOD PRESSURE: 140 MMHG | WEIGHT: 186.2 LBS | HEART RATE: 87 BPM | BODY MASS INDEX: 25.22 KG/M2 | DIASTOLIC BLOOD PRESSURE: 65 MMHG

## 2024-11-11 DIAGNOSIS — M54.16 LUMBAR RADICULOPATHY: ICD-10-CM

## 2024-11-11 DIAGNOSIS — M47.816 LUMBAR SPONDYLOSIS: ICD-10-CM

## 2024-11-11 DIAGNOSIS — M48.061 SPINAL STENOSIS OF LUMBAR REGION, UNSPECIFIED WHETHER NEUROGENIC CLAUDICATION PRESENT: Primary | ICD-10-CM

## 2024-11-11 DIAGNOSIS — M46.1 SACROILIITIS (HCC): ICD-10-CM

## 2024-11-11 DIAGNOSIS — M21.371 RIGHT FOOT DROP: ICD-10-CM

## 2024-11-11 PROCEDURE — 99204 OFFICE O/P NEW MOD 45 MIN: CPT | Performed by: STUDENT IN AN ORGANIZED HEALTH CARE EDUCATION/TRAINING PROGRAM

## 2024-11-11 NOTE — PATIENT COMMUNICATION
Pt is scheduled for SI Joint Injection with Dr Simon on 11/19/24    Pt is not diabetic and injection type does not require medication holds    Pt given instructions in office and via myc message    Have you completed PT/HEP/Chiro in the past 6 months for dedicated area? Per chart, pt has used meds for pain relief  If yes, how long did you complete?  What was the frequency?  Did it provide relief?  If no, reason therapy was not completed?

## 2024-11-11 NOTE — PATIENT INSTRUCTIONS
"Patient Education     Epidural injection   The Basics   Written by the doctors and editors at [facility]   What is an epidural injection? -- An epidural injection can be used to treat a condition called \"radiculopathy.\" This is the medical term for the pain, weakness, numbness, or tingling that happens when nerves coming from the spinal cord get pinched or damaged.  The doctor injects medicines into the space outside the covering of the spinal cord (figure 1). This is similar to an \"epidural\" that is used for pain relief during labor and childbirth.  Epidural injections can be given into different parts of your back:   Cervical epidural injection - Used to help with pain in the head or arms.   Thoracic epidural injection - Used to help with pain in the upper or middle back.   Lumbar epidural injection - Used to help with pain in the lower back or legs.  How do I prepare for an epidural injection? -- The doctor or nurse will tell you if you need to do anything special to prepare. Before your procedure, your doctor will do an exam. They might send you to get tests, such as:   X-ray, ultrasound, or other imaging tests - Imaging tests create pictures of the inside of the body.  Your doctor will also ask you about your \"health history.\" This involves asking you questions about any health problems you have or had in the past, past surgeries, and any medicines you take. Tell them about:   Any medicines you are taking - This includes any prescription or \"over-the-counter\" medicines you use, plus any herbal supplements you take. It helps to write down and bring a list of any medicines you take, or bring a bag with all of your medicines with you.   Any allergies you have   Any bleeding problems you have - Certain medicines, including some herbs and supplements, can increase the risk of bleeding. Some health conditions also increase this risk.  You will also get information about:   Eating and drinking before your procedure - In " "some cases, you might need to \"fast\" before surgery. This means not eating or drinking anything for a period of time. In other cases, you might be allowed to have liquids until a short time before the procedure. Whether you need to fast, and for how long, depends on the procedure you are having.   What help you will need when you go home - For example, you might need to have someone else bring you home or stay with you for some time while you recover.  Ask the doctor or nurse if you have questions or if there is anything you do not understand.  What happens during an epidural injection? -- When it is time for the procedure:   You might get an \"IV,\" which is a thin tube that goes into a vein. This can be used to give you fluids and medicines.   You will get anesthesia medicines to numb the area where the doctor will give the injection. This is to make sure that you do not feel pain during the procedure. You might also get medicines to make you relax and feel sleepy, called \"sedatives.\"   The doctors and nurses will monitor your breathing, blood pressure, and heart rate during the procedure.   The doctor might use a continuous X-ray called \"fluoroscopy.\" This is to help make sure that the medicines are injected into the right place. The doctor might also inject a dye to see where to give the medicine.   The doctor will place a needle through your skin and inject the medicine into a space near your spine. Then, they will remove the needle and cover the area with a clean bandage.   The procedure takes 15 to 30 minutes.  What happens after an epidural injection? -- After your procedure, the staff will watch you closely for a short time. It might take a few days before you feel the effects of the epidural injection.  Before you go home, make sure that you know what problems to look out for and when to call the doctor. Make sure that you understand your doctor's or nurse's instructions. Ask questions about anything you do " "not understand.  For the rest of the day after your procedure:   Try to rest. Limit activities like exercise or driving.   The doctor might recommend an over-the-counter pain medicine. These include acetaminophen (sample brand name: Tylenol), ibuprofen (sample brand names: Advil, Motrin), and naproxen (sample brand name: Aleve).   Ice can help with pain and swelling. Put a cold gel pack, bag of ice, or bag of frozen vegetables on the injection site area every 1 to 2 hours, for 15 minutes each time. Put a thin towel between the ice (or other cold object) and the skin.  What are the risks of an epidural injection? -- Your doctor will talk to you about all of the possible risks, and answer your questions. Possible risks include:   Bleeding   Infection   Headache   Nerve injury  When should I call the doctor? -- Call for emergency help right away (in the [place] and [place], call 9-1-1) if:   You can't move your arms or legs.  Call for advice if:   You have a fever of 100.4°F (38°C) or higher, or chills.   You have redness or swelling around the injection site.   You have a headache.   Your arms or legs are numb, weak, or tingly.  All topics are updated as new evidence becomes available and our peer review process is complete.  This topic retrieved from [employer] on: May 15, 2024.  Topic 329581 Version 1.0  Release: 32.4.3 - C32.134  © 2024 UpToDate, Inc. and/or its affiliates. All rights reserved.  figure 1: Epidural injection     Duringan epidural injection, the doctor inserts a needle between 2 of the bones thatmake up the spine. Then, they inject medicines into the area around the spinalcord. This is an illustration of a \"lumbar\" epidural injection, which is given into the low back. The doctor can place the needle in other areas to treat other types of pain.  Graphic 999172 Version 1.0  Consumer Information Use and Disclaimer   Disclaimer: This generalized information is a limited summary of diagnosis, treatment, and/or " medication information. It is not meant to be comprehensive and should be used as a tool to help the user understand and/or assess potential diagnostic and treatment options. It does NOT include all information about conditions, treatments, medications, side effects, or risks that may apply to a specific patient. It is not intended to be medical advice or a substitute for the medical advice, diagnosis, or treatment of a health care provider based on the health care provider's examination and assessment of a patient's specific and unique circumstances. Patients must speak with a health care provider for complete information about their health, medical questions, and treatment options, including any risks or benefits regarding use of medications. This information does not endorse any treatments or medications as safe, effective, or approved for treating a specific patient. UpToDate, Inc. and its affiliates disclaim any warranty or liability relating to this information or the use thereof.The use of this information is governed by the Terms of Use, available at https://www.[employer].com/en/know/clinical-effectiveness-terms. 2024© UpToDate, Inc. and its affiliates and/or licensors. All rights reserved.  Copyright   © 2024 UpToDate, Inc. and/or its affiliates. All rights reserved.

## 2024-11-13 ENCOUNTER — TELEPHONE (OUTPATIENT)
Age: 84
End: 2024-11-13

## 2024-11-13 NOTE — TELEPHONE ENCOUNTER
Pt would like to have a referral to a cardiologist in the [facility] facility please place referral as he has one (only seen one time) but they are in [facility] and would like all dr's to be in [facility]. Please call when referral is placed

## 2024-11-14 DIAGNOSIS — R06.09 DYSPNEA ON EXERTION: Primary | ICD-10-CM

## 2024-11-15 ENCOUNTER — OFFICE VISIT (OUTPATIENT)
Dept: PODIATRY | Facility: CLINIC | Age: 84
End: 2024-11-15
Payer: MEDICARE

## 2024-11-15 VITALS
DIASTOLIC BLOOD PRESSURE: 63 MMHG | HEART RATE: 73 BPM | WEIGHT: 186 LBS | SYSTOLIC BLOOD PRESSURE: 119 MMHG | BODY MASS INDEX: 25.19 KG/M2 | HEIGHT: 72 IN

## 2024-11-15 DIAGNOSIS — B35.1 ONYCHOMYCOSIS: Primary | ICD-10-CM

## 2024-11-15 DIAGNOSIS — L60.2 OVERGROWN TOENAILS: ICD-10-CM

## 2024-11-15 DIAGNOSIS — R60.1 GENERALIZED EDEMA: ICD-10-CM

## 2024-11-15 DIAGNOSIS — G60.9 IDIOPATHIC NEUROPATHY: ICD-10-CM

## 2024-11-15 PROCEDURE — 99202 OFFICE O/P NEW SF 15 MIN: CPT | Performed by: PODIATRIST

## 2024-11-15 PROCEDURE — 11721 DEBRIDE NAIL 6 OR MORE: CPT | Performed by: PODIATRIST

## 2024-11-15 NOTE — PROGRESS NOTES
Name: Dante Mckinney      : 1940      MRN: [id number]  Encounter Provider: Ernst Anton DPM  Encounter Date: 11/15/2024   Encounter department: [facility] [place]  :  Assessment & Plan  Overgrown toenails    Orders:    Ambulatory Referral to Podiatry    Onychomycosis       Debride mycotic nails and thin the nail plates x 10 with the use of a nail nipper manually and an electric Dremel bur was used to reduce the thickness of the nail beds and smoothed the distal aspect of the nails.   Generalized edema         Idiopathic neuropathy         Patient was dispensed foam toe spacers and shown how to place it in the fourth interspace on the left foot.    Discussed proper shoe gear, daily inspections of feet, and general foot health with patient. Patient has Q9  findings and is recommended for at risk foot care every 9-10 weeks.     Return in about 10 weeks (around 2025).     History of Present Illness     HPI  Dante Mckinney is a 84 y.o. male who presents with chief complaint of painful thick nails on both feet.Patient presents for at-risk foot care.  Patient has no acute concerns today.  Patient has significant lower extremity risk due to neuropathy, parasthesia, edema, and trophic skin changes to the lower extremity.   History obtained from: patient    Review of Systems  Past Medical History   Past Medical History:   Diagnosis Date    Cancer (HCC)     Colorectal cancer (HCC)     Macular degeneration     both eyes    Melanoma in situ of cheek (HCC)     Osteoarthritis      Past Surgical History:   Procedure Laterality Date    CATARACT EXTRACTION Bilateral     2015    COLONOSCOPY      JOINT REPLACEMENT  2009    LAPAROSCOPIC COLON RESECTION      PROSTATE BIOPSY       and     REPLACEMENT TOTAL KNEE Left     2009    REPLACEMENT TOTAL KNEE Right     2016    TOTAL HIP ARTHROPLASTY           Family History   Problem Relation Age of Onset    Cancer Mother     Heart disease Father      Hodgkin's lymphoma Brother     Cancer Brother     Colorectal Cancer Maternal Grandmother     Cancer Sister         Breast & Colon      reports that he quit smoking about 30 years ago. His smoking use included cigarettes. He started smoking about 60 years ago. He has a 15.4 pack-year smoking history. He has never been exposed to tobacco smoke. He has never used smokeless tobacco. He reports current alcohol use of about 40.0 standard drinks of alcohol per week. He reports that he does not use drugs.  Current Outpatient Medications on File Prior to Visit   Medication Sig Dispense Refill    amLODIPine (NORVASC) 2.5 mg tablet Take 2.5 mg by mouth daily      aspirin (ECOTRIN LOW STRENGTH) 81 mg EC tablet Take 81 mg by mouth daily      Calcium Carbonate Antacid 400 MG CHEW Chew      Cholecalciferol 100 MCG (4000 UT) TABS Take 2 tablets by mouth daily      Diclofenac Sodium (VOLTAREN) 1 % Apply 2 g topically 4 (four) times a day 50 g 1    fluticasone (FLONASE) 50 mcg/act nasal spray 2 sprays into each nostril daily      gabapentin (NEURONTIN) 100 mg capsule Take 300 mg by mouth daily at bedtime      guaiFENesin (MUCINEX) 600 mg 12 hr tablet Take 1 tablet by mouth 2 (two) times a day      loratadine (CLARITIN) 10 mg tablet Take 10 mg by mouth      losartan (COZAAR) 25 mg tablet Take 25 mg by mouth      metoprolol succinate (TOPROL-XL) 25 mg 24 hr tablet take 1/2 (one-half) tablet by mouth once daily      rosuvastatin (CRESTOR) 10 MG tablet Take 10 mg by mouth       No current facility-administered medications on file prior to visit.     Allergies   Allergen Reactions    Balance B-50 Dizziness    Omeprazole Other (See Comments)    Prednisone Other (See Comments)    Simvastatin Myalgia      Medical History Reviewed by provider this encounter:     .  Current Outpatient Medications on File Prior to Visit   Medication Sig Dispense Refill    amLODIPine (NORVASC) 2.5 mg tablet Take 2.5 mg by mouth daily      aspirin (ECOTRIN LOW  STRENGTH) 81 mg EC tablet Take 81 mg by mouth daily      Calcium Carbonate Antacid 400 MG CHEW Chew      Cholecalciferol 100 MCG (4000 UT) TABS Take 2 tablets by mouth daily      Diclofenac Sodium (VOLTAREN) 1 % Apply 2 g topically 4 (four) times a day 50 g 1    fluticasone (FLONASE) 50 mcg/act nasal spray 2 sprays into each nostril daily      gabapentin (NEURONTIN) 100 mg capsule Take 300 mg by mouth daily at bedtime      guaiFENesin (MUCINEX) 600 mg 12 hr tablet Take 1 tablet by mouth 2 (two) times a day      loratadine (CLARITIN) 10 mg tablet Take 10 mg by mouth      losartan (COZAAR) 25 mg tablet Take 25 mg by mouth      metoprolol succinate (TOPROL-XL) 25 mg 24 hr tablet take 1/2 (one-half) tablet by mouth once daily      rosuvastatin (CRESTOR) 10 MG tablet Take 10 mg by mouth       No current facility-administered medications on file prior to visit.      Social History     Tobacco Use    Smoking status: Former     Current packs/day: 0.00     Average packs/day: 0.5 packs/day for 30.7 years (15.4 ttl pk-yrs)     Types: Cigarettes     Start date: 1964     Quit date: 10/8/1994     Years since quittin.1     Passive exposure: Never    Smokeless tobacco: Never   Vaping Use    Vaping status: Never Used   Substance and Sexual Activity    Alcohol use: Yes     Alcohol/week: 40.0 standard drinks of alcohol     Types: 40 Cans of beer per week    Drug use: Never    Sexual activity: Not Currently     Partners: Female     Birth control/protection: Male Sterilization        Objective   /63 (BP Location: Right arm, Patient Position: Sitting, Cuff Size: Standard)   Pulse 73   Ht 6' (1.829 m)   Wt 84.4 kg (186 lb)   BMI 25.23 kg/m²      Physical Exam  Vascular status is 0/4 DP PT negative digital hair normal distal cooling slightly delayed capillary refill with edema present bilaterally.  The edema is +2 pitting and capillary refill is approximately 3 seconds.    Derm nails are brittle elongated hypertrophic  yellow-brown discoloration with subungual debris x 10.  There is an increased thickness in the nails of approximately 4 mm.  Small amount of hypertrophic tissue is present in the fourth interspace on the lateral aspect of the fourth PIPJ and medial aspect of the fifth DIPJ.  Small amount of dry flaky tissue is present on both feet and there is slight loss of turgor present bilaterally    Ortho hammertoe deformities are present on the fifth digits bilaterally in a slight adductovarus position.    Neuro light touch is diminished bilaterally and 0.5 monofilament test was absent at all sites that were tested.  The sites were the plantar aspect of the arch.  Submet 3.  Plantar aspect of the hallux, and plantar aspect of the third and fourth digits.    Administrative Statements   I have spent a total time of 15 minutes in caring for this patient on the day of the visit/encounter including Risks and benefits of tx options, Instructions for management, Patient and family education, Importance of tx compliance, Risk factor reductions, Documenting in the medical record, Reviewing / ordering tests, medicine, procedures  , and Obtaining or reviewing history  .

## 2024-11-19 ENCOUNTER — HOSPITAL ENCOUNTER (OUTPATIENT)
Dept: RADIOLOGY | Facility: CLINIC | Age: 84
Discharge: HOME | End: 2024-11-19
Payer: MEDICARE

## 2024-11-19 VITALS
DIASTOLIC BLOOD PRESSURE: 56 MMHG | RESPIRATION RATE: 20 BRPM | TEMPERATURE: 97.5 F | SYSTOLIC BLOOD PRESSURE: 120 MMHG | HEART RATE: 68 BPM | OXYGEN SATURATION: 97 %

## 2024-11-19 DIAGNOSIS — M46.1 SACROILIITIS (HCC): ICD-10-CM

## 2024-11-19 PROCEDURE — 27096 INJECT SACROILIAC JOINT: CPT | Performed by: STUDENT IN AN ORGANIZED HEALTH CARE EDUCATION/TRAINING PROGRAM

## 2024-11-19 RX ORDER — ROPIVACAINE HYDROCHLORIDE 2 MG/ML
1 INJECTION, SOLUTION EPIDURAL; INFILTRATION; PERINEURAL ONCE
Status: COMPLETED | OUTPATIENT
Start: 2024-11-19 | End: 2024-11-19

## 2024-11-19 RX ORDER — METHYLPREDNISOLONE ACETATE 40 MG/ML
40 INJECTION, SUSPENSION INTRA-ARTICULAR; INTRALESIONAL; INTRAMUSCULAR; PARENTERAL; SOFT TISSUE ONCE
Status: COMPLETED | OUTPATIENT
Start: 2024-11-19 | End: 2024-11-19

## 2024-11-19 RX ADMIN — METHYLPREDNISOLONE ACETATE 40 MG: 40 INJECTION, SUSPENSION INTRA-ARTICULAR; INTRALESIONAL; INTRAMUSCULAR; SOFT TISSUE at 10:21

## 2024-11-19 RX ADMIN — IOHEXOL 0.5 ML: 300 INJECTION, SOLUTION INTRAVENOUS at 10:21

## 2024-11-19 RX ADMIN — ROPIVACAINE HYDROCHLORIDE 1 ML: 2 INJECTION, SOLUTION EPIDURAL; INFILTRATION at 10:21

## 2024-11-19 NOTE — INTERVAL H&P NOTE
Update: (This section must be completed if the H&P was completed greater than 24 hrs to procedure or admission)    H&P reviewed. After examining the patient, I find no changed to the H&P since it had been written.    Patient re-evaluated. Accept as history and physical.    Stephen Simon MD/November 19, 2024/10:10 AM

## 2024-11-19 NOTE — DISCHARGE INSTR - LAB

## 2024-12-03 ENCOUNTER — TELEPHONE (OUTPATIENT)
Dept: PAIN MEDICINE | Facility: CLINIC | Age: 84
End: 2024-12-03

## 2024-12-03 DIAGNOSIS — M54.16 LUMBAR RADICULOPATHY: Primary | ICD-10-CM

## 2024-12-03 NOTE — TELEPHONE ENCOUNTER
Next step was to consider L5 tfesi because he has narrowing where that nerve is exiting on the right. Order placed

## 2024-12-04 ENCOUNTER — PATIENT MESSAGE (OUTPATIENT)
Dept: PAIN MEDICINE | Facility: CLINIC | Age: 84
End: 2024-12-04

## 2024-12-04 NOTE — PATIENT COMMUNICATION
Pt is scheduled for TFESI with Dr Simon on 12/26/24     Pt is not diabetic and reports he does not take prescription blood thinners (takes 81mg aspirin, hold is not required for TFESI)     Pt given instruction review via [employer] message     Have you completed PT/HEP/Chiro in the past 6 months for dedicated area? Per chart, pt has used meds for pain relief - pt has also tried injection for pain relief  If yes, how long did you complete?  What was the frequency?  Did it provide relief?  If no, reason therapy was not completed?

## 2024-12-12 ENCOUNTER — HOSPITAL ENCOUNTER (OUTPATIENT)
Dept: RADIOLOGY | Facility: CLINIC | Age: 84
End: 2024-12-12
Payer: MEDICARE

## 2024-12-12 ENCOUNTER — TELEPHONE (OUTPATIENT)
Age: 84
End: 2024-12-12

## 2024-12-12 VITALS
DIASTOLIC BLOOD PRESSURE: 63 MMHG | SYSTOLIC BLOOD PRESSURE: 111 MMHG | OXYGEN SATURATION: 97 % | HEART RATE: 64 BPM | RESPIRATION RATE: 20 BRPM

## 2024-12-12 DIAGNOSIS — M54.16 LUMBAR RADICULOPATHY: ICD-10-CM

## 2024-12-12 PROCEDURE — 64483 NJX AA&/STRD TFRM EPI L/S 1: CPT | Performed by: STUDENT IN AN ORGANIZED HEALTH CARE EDUCATION/TRAINING PROGRAM

## 2024-12-12 RX ORDER — BUPIVACAINE HCL/PF 2.5 MG/ML
1 VIAL (ML) INJECTION ONCE
Status: COMPLETED | OUTPATIENT
Start: 2024-12-12 | End: 2024-12-12

## 2024-12-12 RX ORDER — METHYLPREDNISOLONE ACETATE 40 MG/ML
40 INJECTION, SUSPENSION INTRA-ARTICULAR; INTRALESIONAL; INTRAMUSCULAR; PARENTERAL; SOFT TISSUE ONCE
Status: COMPLETED | OUTPATIENT
Start: 2024-12-12 | End: 2024-12-12

## 2024-12-12 RX ADMIN — METHYLPREDNISOLONE ACETATE 40 MG: 40 INJECTION, SUSPENSION INTRA-ARTICULAR; INTRALESIONAL; INTRAMUSCULAR; SOFT TISSUE at 09:34

## 2024-12-12 RX ADMIN — IOHEXOL 0.5 ML: 300 INJECTION, SOLUTION INTRAVENOUS at 09:33

## 2024-12-12 RX ADMIN — BUPIVACAINE HYDROCHLORIDE 1 ML: 2.5 INJECTION, SOLUTION EPIDURAL; INFILTRATION; INTRACAUDAL at 09:34

## 2024-12-12 NOTE — H&P
History of Present Illness: The patient is a 84 y.o. male who presents with complaints of right leg pain    Past Medical History:   Diagnosis Date    Cancer (HCC)     Colorectal cancer (HCC)     Macular degeneration     both eyes    Melanoma in situ of cheek (HCC)     Osteoarthritis 2014       Past Surgical History:   Procedure Laterality Date    CATARACT EXTRACTION Bilateral     2015    COLONOSCOPY      JOINT REPLACEMENT  2009    LAPAROSCOPIC COLON RESECTION      PROSTATE BIOPSY      2009 and 1994    REPLACEMENT TOTAL KNEE Left     2009    REPLACEMENT TOTAL KNEE Right     2016    TOTAL HIP ARTHROPLASTY      2021         Current Outpatient Medications:     amLODIPine (NORVASC) 2.5 mg tablet, Take 2.5 mg by mouth daily, Disp: , Rfl:     aspirin (ECOTRIN LOW STRENGTH) 81 mg EC tablet, Take 81 mg by mouth daily, Disp: , Rfl:     Calcium Carbonate Antacid 400 MG CHEW, Chew, Disp: , Rfl:     Cholecalciferol 100 MCG (4000 UT) TABS, Take 2 tablets by mouth daily, Disp: , Rfl:     Diclofenac Sodium (VOLTAREN) 1 %, Apply 2 g topically 4 (four) times a day, Disp: 50 g, Rfl: 1    fluticasone (FLONASE) 50 mcg/act nasal spray, 2 sprays into each nostril daily, Disp: , Rfl:     gabapentin (NEURONTIN) 100 mg capsule, Take 300 mg by mouth daily at bedtime, Disp: , Rfl:     guaiFENesin (MUCINEX) 600 mg 12 hr tablet, Take 1 tablet by mouth 2 (two) times a day, Disp: , Rfl:     loratadine (CLARITIN) 10 mg tablet, Take 10 mg by mouth, Disp: , Rfl:     losartan (COZAAR) 25 mg tablet, Take 25 mg by mouth, Disp: , Rfl:     metoprolol succinate (TOPROL-XL) 25 mg 24 hr tablet, take 1/2 (one-half) tablet by mouth once daily, Disp: , Rfl:     rosuvastatin (CRESTOR) 10 MG tablet, Take 10 mg by mouth, Disp: , Rfl:     Allergies   Allergen Reactions    Balance B-50 Dizziness    Omeprazole Other (See Comments)    Prednisone Other (See Comments)    Simvastatin Myalgia       Physical Exam:   Vitals:    12/12/24 0907   BP: 106/61   Pulse: 65   Resp:  20   SpO2: 98%     General: Awake, Alert, Oriented x 3, Mood and affect appropriate  Respiratory: Respirations even and unlabored  Cardiovascular: Peripheral pulses intact; no edema  Musculoskeletal Exam: back non erythematous no lesions    ASA Score: 3    Patient/Chart Verification  Patient ID Verified: Verbal  ID Band Applied: No  Consents Confirmed: To be obtained in the Pre-Procedure area  H&P( within 30 days) Verified: To be obtained in the Procedural area  Interval H&P(within 24 hr) Complete (required for Outpatients and Surgery Admit only): To be obtained in the Procedural area  Allergies Reviewed: Yes  Anticoag/NSAID held?: NA  Currently on antibiotics?: No  Pregnancy denied?: NA    Assessment:   1. Lumbar radiculopathy        Plan: right L5 tfesi

## 2024-12-12 NOTE — DISCHARGE INSTR - LAB
Epidural Steroid Injection   WHAT YOU NEED TO KNOW:   An epidural steroid injection (KELSI) is a procedure to inject steroid medicine into the epidural space. The epidural space is between your spinal cord and vertebrae. Steroids reduce inflammation and fluid buildup in your spine that may be causing pain. You may be given pain medicine along with the steroids.          ACTIVITY  Do not drive or operate machinery today.  No strenuous activity today - bending, lifting, etc.  You may resume normal activites starting tomorrow - start slowly and as tolerated.  You may shower today, but no tub baths or hot tubs.  You may have numbness for several hours from the local anesthetic. Please use caution and common sense, especially with weight-bearing activities.    CARE OF THE INJECTION SITE  If you have soreness or pain, apply ice to the area today (20 minutes on/20 minutes off).  Starting tomorrow, you may use warm, moist heat or ice if needed.  You may have an increase or change in your discomfort for 36-48 hours after your treatment.  Apply ice and continue with any pain medication you have been prescribed.  Notify the [facility] if you have any of the following: redness, drainage, swelling, headache, stiff neck or fever above 100°F.    SPECIAL INSTRUCTIONS  Our office will contact you in approximately 14 days for a progress report.    MEDICATIONS  Continue to take all routine medications.  Our office may have instructed you to hold some medications.    As no general anesthesia was used in today's procedure, you should not experience any side effects related to anesthesia.     If you are diabetic, the steroids used in today's injection may temporarily increase your blood sugar levels after the first few days after your injection. Please keep a close eye on your sugars and alert the doctor who manages your diabetes if your sugars are significantly high from your baseline or you are symptomatic.     If you have a  problem specifically related to your procedure, please call our office at [phone].  Problems not related to your procedure should be directed to your primary care physician.

## 2024-12-12 NOTE — TELEPHONE ENCOUNTER
Caller: Patient     Doctor/Office: [facility]     Call regarding :  APPT     Call was transferred to: [facility]

## 2024-12-12 NOTE — TELEPHONE ENCOUNTER
Pt is ok to discharge per clearance from primary and consults of care team. Discharge instructions including medications and follow-ups given. Pt verbalizes understanding and questions answered. IV and tele removed. All belongings taking with patient. Pt transported of unit via wheelchair to family member car.    Transferred pt to Ortho.

## 2024-12-23 ENCOUNTER — CONSULT (OUTPATIENT)
Dept: CARDIOLOGY CLINIC | Facility: CLINIC | Age: 84
End: 2024-12-23
Payer: MEDICARE

## 2024-12-23 VITALS
HEIGHT: 72 IN | OXYGEN SATURATION: 99 % | RESPIRATION RATE: 16 BRPM | DIASTOLIC BLOOD PRESSURE: 76 MMHG | HEART RATE: 86 BPM | BODY MASS INDEX: 25.73 KG/M2 | WEIGHT: 190 LBS | SYSTOLIC BLOOD PRESSURE: 116 MMHG

## 2024-12-23 DIAGNOSIS — J44.0 CHRONIC OBSTRUCTIVE PULMONARY DISEASE WITH ACUTE LOWER RESPIRATORY INFECTION (HCC): ICD-10-CM

## 2024-12-23 DIAGNOSIS — R07.2 PRECORDIAL PAIN: ICD-10-CM

## 2024-12-23 DIAGNOSIS — E78.2 MIXED HYPERLIPIDEMIA: ICD-10-CM

## 2024-12-23 DIAGNOSIS — R06.09 DYSPNEA ON EXERTION: Primary | ICD-10-CM

## 2024-12-23 DIAGNOSIS — I10 PRIMARY HYPERTENSION: ICD-10-CM

## 2024-12-23 PROCEDURE — 93000 ELECTROCARDIOGRAM COMPLETE: CPT | Performed by: INTERNAL MEDICINE

## 2024-12-23 PROCEDURE — 99204 OFFICE O/P NEW MOD 45 MIN: CPT | Performed by: INTERNAL MEDICINE

## 2024-12-23 NOTE — ASSESSMENT & PLAN NOTE
-The blood pressure is well controlled with the current medications.  No side effect profile has been noted. He will continue with the current dose of antihypertensive medications.  I have asked him to maintain the blood pressure logs to make the necessary changes in the antihypertensive medications if necessary.  -He does have a mild systolic murmur in the aortic area.  I plan to proceed with echocardiogram prior to the cardiac catheterization.    -DASH dietary pattern has been suggested.  Diet rich in fruits, vegetables, whole grains and low-fat dairy products with reduced content of had treated and total fat has been addressed.

## 2024-12-23 NOTE — H&P (VIEW-ONLY)
[facility]  [address] [place] [address]  Cardiology Consult  Dante Mckinney  1940  [phone]    Assessment & Plan  Dyspnea on exertion  -The shortness of breath symptoms is multifactorial etiology.  It could be the manifestation of the angina equivalent or due to COPD exacerbation.  Reviewed the stress test that was performed in [place].  The ejection fraction was normal.  However the TID was elevated which could be because of the balanced myocardial ischemia or because of the hypertension.  The perfusion defect was an artifact.  There was no evidence of having any regional wall motion abnormalities noted.    -Based upon the symptoms of shortness of breath with the stress test abnormality the cardiac catheterization was recommended in [place] and agree with the management planning.  I explained to the patient at great length about the indications for cardiac catheterization and he has agreed to proceed ahead.  Primary hypertension  -The blood pressure is well controlled with the current medications.  No side effect profile has been noted. He will continue with the current dose of antihypertensive medications.  I have asked him to maintain the blood pressure logs to make the necessary changes in the antihypertensive medications if necessary.  -He does have a mild systolic murmur in the aortic area.  I plan to proceed with echocardiogram prior to the cardiac catheterization.    -DASH dietary pattern has been suggested.  Diet rich in fruits, vegetables, whole grains and low-fat dairy products with reduced content of had treated and total fat has been addressed.    Chronic obstructive pulmonary disease with acute lower respiratory infection (HCC)  -He is currently on the home oxygen.  Precordial pain  -The precordial pain that he has been getting is mainly at rest.  Could be because of bronchospasm.  Mixed hyperlipidemia  The lipid profile has been reviewed.  He is going to  continue with the current medications with the further adjustment of the statin therapy based upon the cardiac cath findings.    Continue all medications. Previous studies reviewed with patient, medications reviewed and possible side effects discussed. Continue risk factor modification. Optimize weight, regular exercise and follow up with appropriate specialists and primary care physician as discussed.  All questions answered. Patient advised to report any problems prompting to medical attention. Return for follow up visit in 6 to 8 weeks or earlier if needed    Chief Complaint   Patient presents with   • Establish Care       Interval History: Dante Mckinney is 84 years old, recently relocated from [place] and was told to see the cardiologist because he was found to have the abnormality in the nuclear stress test.  He has been getting the symptoms of shortness of breath on minimal exertion along with the chest pain at times at rest.  Exercise capacity is very limited.  He has a history of smoking for 20 to 30 years on chronic oxygen at the nighttime because of the shortness of breath.  No history of having any swelling in the lower extremities, orthopnea, or PND.  There is no history of having any palpitations or syncopal episodes.  There is no documented history of coronary artery disease in the past.  The shortness of breath last couple of months.      Dante Mckinney is a 84 y.o. male new to the office. He recently relocated from [place] and was told to see a cardiologist sooner instead of later he had a nuclear stress test done before he left due to some shortness of breath I have the report and it did not show any ischemia or defects but the 3 times daily was increased so he was recommended to have a cath. The patient has a smoking history of about 20 to 30 years and is on chronic oxygen at night he does get short of breath with some activity he has only ever had 1 brief episode of chest pain and he has been doing  a lot of work recently with some  His medications were adjusted I suggested to him that I would like to wait a bit to see if he does develop any more symptoms because the shortness of breath could easily be on a pulmonary basis.     PMH:     Patient Active Problem List   Diagnosis   • Colorectal cancer (HCC)   • History of colon resection   • Prostate cancer (HCC)   • Malignant melanoma of face excluding eyelid, nose, lip, and ear (HCC)   • Macular degeneration   • BRCA1 gene mutation positive   • Chronic obstructive lung disease (HCC)   • Gastroesophageal reflux disease   • Mixed hyperlipidemia   • Obstructive sleep apnea syndrome   • Osteoporosis   • Primary hypertension   • Arthritis of right knee   • Right foot drop     Past Medical History:   Diagnosis Date   • Cancer (HCC)    • Colorectal cancer (HCC)    • Macular degeneration     both eyes   • Melanoma in situ of cheek (HCC)    • Osteoarthritis      Social History     Socioeconomic History   • Marital status: /Civil Union     Spouse name: Not on file   • Number of children: Not on file   • Years of education: Not on file   • Highest education level: Not on file   Occupational History   • Not on file   Tobacco Use   • Smoking status: Former     Current packs/day: 0.00     Average packs/day: 0.5 packs/day for 30.7 years (15.4 ttl pk-yrs)     Types: Cigarettes     Start date: 1964     Quit date: 10/8/1994     Years since quittin.2     Passive exposure: Never   • Smokeless tobacco: Never   Vaping Use   • Vaping status: Never Used   Substance and Sexual Activity   • Alcohol use: Yes     Alcohol/week: 40.0 standard drinks of alcohol     Types: 40 Cans of beer per week   • Drug use: Never   • Sexual activity: Not Currently     Partners: Female     Birth control/protection: Male Sterilization   Other Topics Concern   • Not on file   Social History Narrative   • Not on file     Social Drivers of Health     Financial Resource Strain: Not on file    Food Insecurity: Not on file   Transportation Needs: Not on file   Physical Activity: Inactive (10/8/2024)    Exercise Vital Sign    • Days of Exercise per Week: 0 days    • Minutes of Exercise per Session: 0 min   Stress: Not on file   Social Connections: Not on file   Intimate Partner Violence: Not on file   Housing Stability: Not on file      Family History   Problem Relation Age of Onset   • Cancer Mother    • Heart disease Father    • Hodgkin's lymphoma Brother    • Cancer Brother    • Colorectal Cancer Maternal Grandmother    • Cancer Sister         Breast & Colon     Past Surgical History:   Procedure Laterality Date   • CATARACT EXTRACTION Bilateral     2015   • COLONOSCOPY     • JOINT REPLACEMENT  2009   • LAPAROSCOPIC COLON RESECTION     • PROSTATE BIOPSY      2009 and 1994   • REPLACEMENT TOTAL KNEE Left     2009   • REPLACEMENT TOTAL KNEE Right     2016   • TOTAL HIP ARTHROPLASTY      2021       Current Outpatient Medications:   •  amLODIPine (NORVASC) 2.5 mg tablet, Take 2.5 mg by mouth daily, Disp: , Rfl:   •  aspirin (ECOTRIN LOW STRENGTH) 81 mg EC tablet, Take 81 mg by mouth daily, Disp: , Rfl:   •  Calcium Carbonate Antacid 400 MG CHEW, Chew, Disp: , Rfl:   •  Cholecalciferol 100 MCG (4000 UT) TABS, Take 2 tablets by mouth daily, Disp: , Rfl:   •  fluticasone (FLONASE) 50 mcg/act nasal spray, 2 sprays into each nostril daily, Disp: , Rfl:   •  guaiFENesin (MUCINEX) 600 mg 12 hr tablet, Take 1 tablet by mouth 2 (two) times a day, Disp: , Rfl:   •  loratadine (CLARITIN) 10 mg tablet, Take 10 mg by mouth, Disp: , Rfl:   •  losartan (COZAAR) 25 mg tablet, Take 25 mg by mouth, Disp: , Rfl:   •  metoprolol succinate (TOPROL-XL) 25 mg 24 hr tablet, take 1/2 (one-half) tablet by mouth once daily, Disp: , Rfl:   •  rosuvastatin (CRESTOR) 10 MG tablet, Take 10 mg by mouth, Disp: , Rfl:   •  Diclofenac Sodium (VOLTAREN) 1 %, Apply 2 g topically 4 (four) times a day, Disp: 50 g, Rfl: 1  •  gabapentin  (NEURONTIN) 100 mg capsule, Take 300 mg by mouth daily at bedtime, Disp: , Rfl:   Allergies   Allergen Reactions   • Balance B-50 Dizziness   • Omeprazole Other (See Comments)   • Prednisone Other (See Comments)   • Simvastatin Myalgia             Review of Systems:  Review of Systems   Constitutional: Negative.  Negative for chills and fever.   HENT:  Negative for ear pain and sore throat.    Eyes:  Negative for pain and visual disturbance.   Respiratory:  Positive for chest tightness and shortness of breath. Negative for cough.    Cardiovascular:  Negative for chest pain and palpitations.   Gastrointestinal:  Negative for abdominal pain and vomiting.   Genitourinary:  Negative for dysuria and hematuria.   Musculoskeletal:  Negative for arthralgias and back pain.   Skin:  Negative for color change and rash.   Neurological:  Negative for seizures and syncope.   All other systems reviewed and are negative.        /76 (BP Location: Left arm, Patient Position: Sitting, Cuff Size: Standard)   Pulse 86   Resp 16   Ht 6' (1.829 m)   Wt 86.2 kg (190 lb)   SpO2 99%   BMI 25.77 kg/m²     Physical Exam:  Physical Exam  Vitals reviewed.   Constitutional:       Appearance: Normal appearance.   HENT:      Head: Normocephalic.   Eyes:      Pupils: Pupils are equal, round, and reactive to light.   Cardiovascular:      Rate and Rhythm: Regular rhythm.      Pulses: Normal pulses.      Heart sounds: Murmur heard.      Comments: 1-2/ 6 systolic murmur in the aortic area.  No evidence of diastolic murmur noted.  Pulmonary:      Effort: Pulmonary effort is normal.      Breath sounds: Normal breath sounds. No wheezing.   Abdominal:      General: Abdomen is flat.      Palpations: Abdomen is soft.   Skin:     General: Skin is warm.   Neurological:      Mental Status: He is alert.

## 2024-12-23 NOTE — ASSESSMENT & PLAN NOTE
The lipid profile has been reviewed.  He is going to continue with the current medications with the further adjustment of the statin therapy based upon the cardiac cath findings.

## 2024-12-23 NOTE — PROGRESS NOTES
[facility]  [address] [address]  Cardiology Consult  Dante Mckinney  1940  [phone]    Assessment & Plan  Dyspnea on exertion  -The shortness of breath symptoms is multifactorial etiology.  It could be the manifestation of the angina equivalent or due to COPD exacerbation.  Reviewed the stress test that was performed in [place].  The ejection fraction was normal.  However the TID was elevated which could be because of the balanced myocardial ischemia or because of the hypertension.  The perfusion defect was an artifact.  There was no evidence of having any regional wall motion abnormalities noted.    -Based upon the symptoms of shortness of breath with the stress test abnormality the cardiac catheterization was recommended in [place] and agree with the management planning.  I explained to the patient at great length about the indications for cardiac catheterization and he has agreed to proceed ahead.  Primary hypertension  -The blood pressure is well controlled with the current medications.  No side effect profile has been noted. He will continue with the current dose of antihypertensive medications.  I have asked him to maintain the blood pressure logs to make the necessary changes in the antihypertensive medications if necessary.  -He does have a mild systolic murmur in the aortic area.  I plan to proceed with echocardiogram prior to the cardiac catheterization.    -DASH dietary pattern has been suggested.  Diet rich in fruits, vegetables, whole grains and low-fat dairy products with reduced content of had treated and total fat has been addressed.    Chronic obstructive pulmonary disease with acute lower respiratory infection (HCC)  -He is currently on the home oxygen.  Precordial pain  -The precordial pain that he has been getting is mainly at rest.  Could be because of bronchospasm.  Mixed hyperlipidemia  The lipid profile has been reviewed.  He is going to  continue with the current medications with the further adjustment of the statin therapy based upon the cardiac cath findings.    Continue all medications. Previous studies reviewed with patient, medications reviewed and possible side effects discussed. Continue risk factor modification. Optimize weight, regular exercise and follow up with appropriate specialists and primary care physician as discussed.  All questions answered. Patient advised to report any problems prompting to medical attention. Return for follow up visit in 6 to 8 weeks or earlier if needed    Chief Complaint   Patient presents with   • Establish Care       Interval History: Dante Mckinney is 84 years old, recently relocated from [place] and was told to see the cardiologist because he was found to have the abnormality in the nuclear stress test.  He has been getting the symptoms of shortness of breath on minimal exertion along with the chest pain at times at rest.  Exercise capacity is very limited.  He has a history of smoking for 20 to 30 years on chronic oxygen at the nighttime because of the shortness of breath.  No history of having any swelling in the lower extremities, orthopnea, or PND.  There is no history of having any palpitations or syncopal episodes.  There is no documented history of coronary artery disease in the past.  The shortness of breath last couple of months.      Dante Mckinney is a 84 y.o. male new to the office. He recently relocated from [place] and was told to see a cardiologist sooner instead of later he had a nuclear stress test done before he left due to some shortness of breath I have the report and it did not show any ischemia or defects but the 3 times daily was increased so he was recommended to have a cath. The patient has a smoking history of about 20 to 30 years and is on chronic oxygen at night he does get short of breath with some activity he has only ever had 1 brief episode of chest pain and he has been doing  a lot of work recently with some  His medications were adjusted I suggested to him that I would like to wait a bit to see if he does develop any more symptoms because the shortness of breath could easily be on a pulmonary basis.     PMH:     Patient Active Problem List   Diagnosis   • Colorectal cancer (HCC)   • History of colon resection   • Prostate cancer (HCC)   • Malignant melanoma of face excluding eyelid, nose, lip, and ear (HCC)   • Macular degeneration   • BRCA1 gene mutation positive   • Chronic obstructive lung disease (HCC)   • Gastroesophageal reflux disease   • Mixed hyperlipidemia   • Obstructive sleep apnea syndrome   • Osteoporosis   • Primary hypertension   • Arthritis of right knee   • Right foot drop     Past Medical History:   Diagnosis Date   • Cancer (HCC)    • Colorectal cancer (HCC)    • Macular degeneration     both eyes   • Melanoma in situ of cheek (HCC)    • Osteoarthritis      Social History     Socioeconomic History   • Marital status: /Civil Union     Spouse name: Not on file   • Number of children: Not on file   • Years of education: Not on file   • Highest education level: Not on file   Occupational History   • Not on file   Tobacco Use   • Smoking status: Former     Current packs/day: 0.00     Average packs/day: 0.5 packs/day for 30.7 years (15.4 ttl pk-yrs)     Types: Cigarettes     Start date: 1964     Quit date: 10/8/1994     Years since quittin.2     Passive exposure: Never   • Smokeless tobacco: Never   Vaping Use   • Vaping status: Never Used   Substance and Sexual Activity   • Alcohol use: Yes     Alcohol/week: 40.0 standard drinks of alcohol     Types: 40 Cans of beer per week   • Drug use: Never   • Sexual activity: Not Currently     Partners: Female     Birth control/protection: Male Sterilization   Other Topics Concern   • Not on file   Social History Narrative   • Not on file     Social Drivers of Health     Financial Resource Strain: Not on file    Food Insecurity: Not on file   Transportation Needs: Not on file   Physical Activity: Inactive (10/8/2024)    Exercise Vital Sign    • Days of Exercise per Week: 0 days    • Minutes of Exercise per Session: 0 min   Stress: Not on file   Social Connections: Not on file   Intimate Partner Violence: Not on file   Housing Stability: Not on file      Family History   Problem Relation Age of Onset   • Cancer Mother    • Heart disease Father    • Hodgkin's lymphoma Brother    • Cancer Brother    • Colorectal Cancer Maternal Grandmother    • Cancer Sister         Breast & Colon     Past Surgical History:   Procedure Laterality Date   • CATARACT EXTRACTION Bilateral     2015   • COLONOSCOPY     • JOINT REPLACEMENT  2009   • LAPAROSCOPIC COLON RESECTION     • PROSTATE BIOPSY      2009 and 1994   • REPLACEMENT TOTAL KNEE Left     2009   • REPLACEMENT TOTAL KNEE Right     2016   • TOTAL HIP ARTHROPLASTY      2021       Current Outpatient Medications:   •  amLODIPine (NORVASC) 2.5 mg tablet, Take 2.5 mg by mouth daily, Disp: , Rfl:   •  aspirin (ECOTRIN LOW STRENGTH) 81 mg EC tablet, Take 81 mg by mouth daily, Disp: , Rfl:   •  Calcium Carbonate Antacid 400 MG CHEW, Chew, Disp: , Rfl:   •  Cholecalciferol 100 MCG (4000 UT) TABS, Take 2 tablets by mouth daily, Disp: , Rfl:   •  fluticasone (FLONASE) 50 mcg/act nasal spray, 2 sprays into each nostril daily, Disp: , Rfl:   •  guaiFENesin (MUCINEX) 600 mg 12 hr tablet, Take 1 tablet by mouth 2 (two) times a day, Disp: , Rfl:   •  loratadine (CLARITIN) 10 mg tablet, Take 10 mg by mouth, Disp: , Rfl:   •  losartan (COZAAR) 25 mg tablet, Take 25 mg by mouth, Disp: , Rfl:   •  metoprolol succinate (TOPROL-XL) 25 mg 24 hr tablet, take 1/2 (one-half) tablet by mouth once daily, Disp: , Rfl:   •  rosuvastatin (CRESTOR) 10 MG tablet, Take 10 mg by mouth, Disp: , Rfl:   •  Diclofenac Sodium (VOLTAREN) 1 %, Apply 2 g topically 4 (four) times a day, Disp: 50 g, Rfl: 1  •  gabapentin  (NEURONTIN) 100 mg capsule, Take 300 mg by mouth daily at bedtime, Disp: , Rfl:   Allergies   Allergen Reactions   • Balance B-50 Dizziness   • Omeprazole Other (See Comments)   • Prednisone Other (See Comments)   • Simvastatin Myalgia             Review of Systems:  Review of Systems   Constitutional: Negative.  Negative for chills and fever.   HENT:  Negative for ear pain and sore throat.    Eyes:  Negative for pain and visual disturbance.   Respiratory:  Positive for chest tightness and shortness of breath. Negative for cough.    Cardiovascular:  Negative for chest pain and palpitations.   Gastrointestinal:  Negative for abdominal pain and vomiting.   Genitourinary:  Negative for dysuria and hematuria.   Musculoskeletal:  Negative for arthralgias and back pain.   Skin:  Negative for color change and rash.   Neurological:  Negative for seizures and syncope.   All other systems reviewed and are negative.        /76 (BP Location: Left arm, Patient Position: Sitting, Cuff Size: Standard)   Pulse 86   Resp 16   Ht 6' (1.829 m)   Wt 86.2 kg (190 lb)   SpO2 99%   BMI 25.77 kg/m²     Physical Exam:  Physical Exam  Vitals reviewed.   Constitutional:       Appearance: Normal appearance.   HENT:      Head: Normocephalic.   Eyes:      Pupils: Pupils are equal, round, and reactive to light.   Cardiovascular:      Rate and Rhythm: Regular rhythm.      Pulses: Normal pulses.      Heart sounds: Murmur heard.      Comments: 1-2/ 6 systolic murmur in the aortic area.  No evidence of diastolic murmur noted.  Pulmonary:      Effort: Pulmonary effort is normal.      Breath sounds: Normal breath sounds. No wheezing.   Abdominal:      General: Abdomen is flat.      Palpations: Abdomen is soft.   Skin:     General: Skin is warm.   Neurological:      Mental Status: He is alert.

## 2024-12-26 ENCOUNTER — TELEPHONE (OUTPATIENT)
Dept: PAIN MEDICINE | Facility: CLINIC | Age: 84
End: 2024-12-26

## 2024-12-26 ENCOUNTER — TELEPHONE (OUTPATIENT)
Dept: CARDIOLOGY CLINIC | Facility: CLINIC | Age: 84
End: 2024-12-26

## 2024-12-26 NOTE — TELEPHONE ENCOUNTER
Attempted to reach pt to schedule card cath. Phone rang out. Called pt's spouse but no answer and VM no set up.

## 2025-01-03 ENCOUNTER — RESULTS FOLLOW-UP (OUTPATIENT)
Dept: CARDIOLOGY CLINIC | Facility: CLINIC | Age: 85
End: 2025-01-03

## 2025-01-03 ENCOUNTER — HOSPITAL ENCOUNTER (OUTPATIENT)
Dept: NON INVASIVE DIAGNOSTICS | Facility: CLINIC | Age: 85
Discharge: HOME | End: 2025-01-03
Payer: MEDICARE

## 2025-01-03 VITALS
HEART RATE: 91 BPM | BODY MASS INDEX: 25.73 KG/M2 | HEIGHT: 72 IN | WEIGHT: 190 LBS | SYSTOLIC BLOOD PRESSURE: 116 MMHG | DIASTOLIC BLOOD PRESSURE: 76 MMHG

## 2025-01-03 DIAGNOSIS — E78.2 MIXED HYPERLIPIDEMIA: ICD-10-CM

## 2025-01-03 DIAGNOSIS — R06.09 DYSPNEA ON EXERTION: ICD-10-CM

## 2025-01-03 DIAGNOSIS — R07.2 PRECORDIAL PAIN: ICD-10-CM

## 2025-01-03 DIAGNOSIS — I10 PRIMARY HYPERTENSION: ICD-10-CM

## 2025-01-03 DIAGNOSIS — J44.0 CHRONIC OBSTRUCTIVE PULMONARY DISEASE WITH ACUTE LOWER RESPIRATORY INFECTION (HCC): ICD-10-CM

## 2025-01-03 LAB
AORTIC ROOT: 4 CM
APICAL FOUR CHAMBER EJECTION FRACTION: 70 %
ASCENDING AORTA: 3.9 CM
BSA FOR ECHO PROCEDURE: 2.08 M2
E WAVE DECELERATION TIME: 173 MS
E/A RATIO: 0.69
FRACTIONAL SHORTENING: 33 (ref 28–44)
INTERVENTRICULAR SEPTUM IN DIASTOLE (PARASTERNAL SHORT AXIS VIEW): 1 CM
INTERVENTRICULAR SEPTUM: 1 CM (ref 0.6–1.1)
LAAS-AP2: 18.4 CM2
LAAS-AP4: 19.2 CM2
LEFT ATRIUM SIZE: 3.5 CM
LEFT ATRIUM VOLUME (MOD BIPLANE): 50 ML
LEFT ATRIUM VOLUME INDEX (MOD BIPLANE): 24 ML/M2
LEFT INTERNAL DIMENSION IN SYSTOLE: 3.2 CM (ref 2.1–4)
LEFT VENTRICULAR INTERNAL DIMENSION IN DIASTOLE: 4.8 CM (ref 3.5–6)
LEFT VENTRICULAR POSTERIOR WALL IN END DIASTOLE: 1 CM
LEFT VENTRICULAR STROKE VOLUME: 68 ML
LVSV (TEICH): 68 ML
MV E'TISSUE VEL-SEP: 11 CM/S
MV PEAK A VEL: 0.93 M/S
MV PEAK E VEL: 64 CM/S
MV STENOSIS PRESSURE HALF TIME: 50 MS
MV VALVE AREA P 1/2 METHOD: 4.4
RIGHT ATRIUM AREA SYSTOLE A4C: 15.3 CM2
RIGHT VENTRICLE ID DIMENSION: 2.4 CM
SL CV LEFT ATRIUM LENGTH A2C: 5.7 CM
SL CV LV EF: 60
SL CV PED ECHO LEFT VENTRICLE DIASTOLIC VOLUME (MOD BIPLANE) 2D: 108 ML
SL CV PED ECHO LEFT VENTRICLE SYSTOLIC VOLUME (MOD BIPLANE) 2D: 40 ML
TRICUSPID ANNULAR PLANE SYSTOLIC EXCURSION: 1.9 CM

## 2025-01-03 PROCEDURE — 93306 TTE W/DOPPLER COMPLETE: CPT

## 2025-01-03 PROCEDURE — 93306 TTE W/DOPPLER COMPLETE: CPT | Performed by: INTERNAL MEDICINE

## 2025-01-06 ENCOUNTER — OFFICE VISIT (OUTPATIENT)
Age: 85
End: 2025-01-06
Payer: MEDICARE

## 2025-01-06 ENCOUNTER — TELEPHONE (OUTPATIENT)
Dept: CARDIOLOGY CLINIC | Facility: CLINIC | Age: 85
End: 2025-01-06

## 2025-01-06 VITALS
HEIGHT: 72 IN | OXYGEN SATURATION: 98 % | RESPIRATION RATE: 18 BRPM | SYSTOLIC BLOOD PRESSURE: 118 MMHG | WEIGHT: 190.2 LBS | DIASTOLIC BLOOD PRESSURE: 60 MMHG | BODY MASS INDEX: 25.76 KG/M2 | TEMPERATURE: 96.7 F | HEART RATE: 92 BPM

## 2025-01-06 DIAGNOSIS — Z12.31 ENCOUNTER FOR SCREENING MAMMOGRAM FOR MALIGNANT NEOPLASM OF BREAST: ICD-10-CM

## 2025-01-06 DIAGNOSIS — E78.2 MIXED HYPERLIPIDEMIA: ICD-10-CM

## 2025-01-06 DIAGNOSIS — Z15.01 BRCA1 GENE MUTATION POSITIVE: ICD-10-CM

## 2025-01-06 DIAGNOSIS — C19 COLORECTAL CANCER (HCC): ICD-10-CM

## 2025-01-06 DIAGNOSIS — M81.0 AGE-RELATED OSTEOPOROSIS WITHOUT CURRENT PATHOLOGICAL FRACTURE: ICD-10-CM

## 2025-01-06 DIAGNOSIS — Z15.09 BRCA1 GENE MUTATION POSITIVE: ICD-10-CM

## 2025-01-06 DIAGNOSIS — I10 PRIMARY HYPERTENSION: ICD-10-CM

## 2025-01-06 DIAGNOSIS — B96.89 ACUTE BACTERIAL SINUSITIS: Primary | ICD-10-CM

## 2025-01-06 DIAGNOSIS — G47.33 OBSTRUCTIVE SLEEP APNEA SYNDROME: ICD-10-CM

## 2025-01-06 DIAGNOSIS — J01.90 ACUTE BACTERIAL SINUSITIS: Primary | ICD-10-CM

## 2025-01-06 DIAGNOSIS — J44.9 CHRONIC OBSTRUCTIVE PULMONARY DISEASE, UNSPECIFIED COPD TYPE (HCC): ICD-10-CM

## 2025-01-06 PROCEDURE — G2211 COMPLEX E/M VISIT ADD ON: HCPCS | Performed by: STUDENT IN AN ORGANIZED HEALTH CARE EDUCATION/TRAINING PROGRAM

## 2025-01-06 PROCEDURE — 99214 OFFICE O/P EST MOD 30 MIN: CPT | Performed by: STUDENT IN AN ORGANIZED HEALTH CARE EDUCATION/TRAINING PROGRAM

## 2025-01-06 RX ORDER — METHYLPREDNISOLONE 4 MG/1
TABLET ORAL
Qty: 21 EACH | Refills: 0 | Status: SHIPPED | OUTPATIENT
Start: 2025-01-06

## 2025-01-06 RX ORDER — ALBUTEROL SULFATE 90 UG/1
2 INHALANT RESPIRATORY (INHALATION) EVERY 6 HOURS PRN
Qty: 18 G | Refills: 5 | Status: SHIPPED | OUTPATIENT
Start: 2025-01-06

## 2025-01-06 NOTE — ASSESSMENT & PLAN NOTE
Documented history of osteoporosis that patient says was diagnosed many years ago. Was never on treatment. Check DEXA.    Orders:    DXA bone density spine hip and pelvis; Future

## 2025-01-06 NOTE — PROGRESS NOTES
Name: Dante Mckinney      : 1940      MRN: [id number]  Encounter Provider: Severino Hilton MD  Encounter Date: 2025   Encounter department: [facility]  :  Assessment & Plan  Acute bacterial sinusitis  Seems to be that his symptoms are stemming from sinus infection present now for 3 weeks. Also has documented history of COPD, though this does not necessarily seem like COPD exacerbation.  Augmentin BID x 7 days  Flonase daily  Medrol dose pack    Orders:    methylPREDNISolone 4 MG tablet therapy pack; Use as directed on package    amoxicillin-clavulanate (AUGMENTIN) 875-125 mg per tablet; Take 1 tablet by mouth every 12 (twelve) hours for 7 days    Chronic obstructive pulmonary disease, unspecified COPD type (HCC)  Patient has documented history of COPD that he reports was diagnosed many years ago. He denies ever being on inhaler. He is currently awaiting cardiac cath due to complaints of MASCORRO recently. Discussed with patient that he may benefit from being on maintenance inhaler, or at least getting PFTs done to determine severity of his disease. He would like to undergo cardiac cath first before starting any treatment for COPD. He notably uses O2 at night for AURE.  Albuterol PRN  Consider PFTs after cardiac cath completed    Orders:    albuterol (Ventolin HFA) 90 mcg/act inhaler; Inhale 2 puffs every 6 (six) hours as needed for wheezing    Obstructive sleep apnea syndrome  Patient uses O2 at night.       Primary hypertension  BP well controlled. Continue current doses of amlodipine 2.5 mg qd, losartan 25 mg qd.       Mixed hyperlipidemia  Continue crestor 10 mg qd. Recheck lipid panel pending from previous.       Colorectal cancer (HCC)  S/p resection.       Age-related osteoporosis without current pathological fracture  Documented history of osteoporosis that patient says was diagnosed many years ago. Was never on treatment. Check DEXA.    Orders:    DXA bone density spine hip  and pelvis; Future    BRCA1 gene mutation positive  Has been offered mammograms in the past but never completed. He is agreeable to mammogram order today.    Orders:    Mammo screening bilateral w 3d and cad; Future    Encounter for screening mammogram for malignant neoplasm of breast  Patient is BRCA1 positive and has never had mammogram done. Mammogram ordered.    Orders:    Mammo screening bilateral w 3d and cad; Future           History of Present Illness     Dante Mckinney is an 85 yo M with PMH of colorectal CA s/p resection, prostate CA, AURE, osteoporosis, COPD, HTN, HLD, and BRCA1 positive who presents today for follow up. He also has been having cold like symptoms for last three weeks including cough, congestion, rhinorrhea, sinus pressure, SOB, wheezing.       Review of Systems   Constitutional:  Negative for appetite change, chills and fever.   HENT:  Positive for congestion and sinus pressure. Negative for sore throat.    Eyes:  Negative for visual disturbance.   Respiratory:  Positive for cough, shortness of breath and wheezing.    Cardiovascular:  Negative for chest pain and leg swelling.   Gastrointestinal:  Negative for abdominal pain, constipation, diarrhea and nausea.   Genitourinary:  Negative for difficulty urinating and dysuria.   Musculoskeletal:  Negative for arthralgias and myalgias.   Skin:  Negative for rash.   Neurological:  Negative for dizziness, light-headedness and headaches.   Psychiatric/Behavioral:  Negative for confusion.        Objective   /60 (BP Location: Left arm, Patient Position: Sitting, Cuff Size: Large)   Pulse 92   Temp (!) 96.7 °F (35.9 °C) (Tympanic)   Resp 18   Ht 6' (1.829 m)   Wt 86.3 kg (190 lb 3.2 oz)   SpO2 98%   BMI 25.80 kg/m²      Physical Exam  Constitutional:       General: He is not in acute distress.  HENT:      Right Ear: Tympanic membrane, ear canal and external ear normal.      Left Ear: Ear canal and external ear normal. There is impacted  cerumen.      Nose: Congestion and rhinorrhea present.   Eyes:      Conjunctiva/sclera: Conjunctivae normal.      Pupils: Pupils are equal, round, and reactive to light.   Neck:      Thyroid: No thyroid mass or thyroid tenderness.   Cardiovascular:      Rate and Rhythm: Normal rate and regular rhythm.      Heart sounds: Normal heart sounds.   Pulmonary:      Effort: Pulmonary effort is normal.      Breath sounds: Normal breath sounds.      Comments: Conversational dyspnea  Musculoskeletal:      Cervical back: Neck supple.      Right lower leg: No edema.      Left lower leg: No edema.   Skin:     General: Skin is warm and dry.   Neurological:      Mental Status: He is alert.   Psychiatric:         Mood and Affect: Mood normal.         Speech: Speech normal.         Behavior: Behavior normal. Behavior is cooperative.

## 2025-01-06 NOTE — ASSESSMENT & PLAN NOTE
Has been offered mammograms in the past but never completed. He is agreeable to mammogram order today.    Orders:    Mammo screening bilateral w 3d and cad; Future

## 2025-01-06 NOTE — ASSESSMENT & PLAN NOTE
Patient has documented history of COPD that he reports was diagnosed many years ago. He denies ever being on inhaler. He is currently awaiting cardiac cath due to complaints of MASCORRO recently. Discussed with patient that he may benefit from being on maintenance inhaler, or at least getting PFTs done to determine severity of his disease. He would like to undergo cardiac cath first before starting any treatment for COPD. He notably uses O2 at night for AURE.  Albuterol PRN  Consider PFTs after cardiac cath completed    Orders:    albuterol (Ventolin HFA) 90 mcg/act inhaler; Inhale 2 puffs every 6 (six) hours as needed for wheezing

## 2025-01-06 NOTE — TELEPHONE ENCOUNTER
Caller: Nura    Doctor: Dr. Lara     Reason for call: pt was calling to confirm if there has been any update on the cath procedure, I informed pt I did not see any communication about a cath but I will send a message to get clarification for him.    Call back#: [phone]

## 2025-01-07 ENCOUNTER — TELEPHONE (OUTPATIENT)
Dept: ADMINISTRATIVE | Facility: OTHER | Age: 85
End: 2025-01-07

## 2025-01-07 ENCOUNTER — PREP FOR PROCEDURE (OUTPATIENT)
Dept: CARDIOLOGY CLINIC | Facility: CLINIC | Age: 85
End: 2025-01-07

## 2025-01-07 DIAGNOSIS — R06.09 DYSPNEA ON EXERTION: Primary | ICD-10-CM

## 2025-01-07 DIAGNOSIS — R07.2 PRECORDIAL PAIN: ICD-10-CM

## 2025-01-07 NOTE — LETTER
Procedure Request Form: Medicare Annual Wellness Visit (AWV)      Date Requested: 01/10/25  Patient: Dante Mckinney      2nd Request  Patient : 1940   Referring Provider: Severino Hilton MD        Date of Procedure ______________________________       The above patient has informed us that they have completed their   most recent Medicare Annual Wellness Visit (AWV) at your facility. Please complete   this form and attach all corresponding procedure reports/results.    Comments __________________________________________________________  ____________________________________________________________________  ____________________________________________________________________  ____________________________________________________________________    Facility Completing Procedure _________________________________________    Form Completed By (print name) _______________________________________      Signature __________________________________________________________      These reports are needed for  compliance.    Please fax this completed form and a copy of the procedure report to our office located at [address] [place] [address] as soon as possible to Fax [phone] attention Mohsen: Phone [phone]    We thank you for your assistance in treating our mutual patient.

## 2025-01-07 NOTE — TELEPHONE ENCOUNTER
Upon review of the In Basket request and the patient's chart, initial outreach has been made via fax to facility. Please see Contacts section for details.     Thank you  Mohsen Sandy MA

## 2025-01-07 NOTE — LETTER
Procedure Request Form: Medicare Annual Wellness Visit (AWV)      Date Requested: 25  Patient: Dante Mckinney  Patient : 1940   Referring Provider: Severino Hilton MD        Date of Procedure ______________________________       The above patient has informed us that they have completed their   most recent Medicare Annual Wellness Visit (AWV) at your facility. Please complete   this form and attach all corresponding procedure reports/results.    Comments __________________________________________________________  ____________________________________________________________________  ____________________________________________________________________  ____________________________________________________________________    Facility Completing Procedure _________________________________________    Form Completed By (print name) _______________________________________      Signature __________________________________________________________      These reports are needed for  compliance.    Please fax this completed form and a copy of the procedure report to our office located at [address] [place] [address] as soon as possible to Fax [phone] attention Mohsen: Phone [phone]    We thank you for your assistance in treating our mutual patient.

## 2025-01-07 NOTE — TELEPHONE ENCOUNTER
----- Message from Larua WARREN sent at 1/6/2025  1:24 PM EST -----  Regarding: Medicare wellness   Patient had a Medicare wellness with previous PCP Dr. Andrea Da Silva in [place], [place]   Phone # is [phone]. Can we please locate his records and update care gap.       Thank you,   KRZYSZTOF Cruz

## 2025-01-08 ENCOUNTER — RESULTS FOLLOW-UP (OUTPATIENT)
Age: 85
End: 2025-01-08

## 2025-01-08 ENCOUNTER — APPOINTMENT (OUTPATIENT)
Dept: LAB | Facility: HOSPITAL | Age: 85
End: 2025-01-08
Attending: INTERNAL MEDICINE
Payer: MEDICARE

## 2025-01-08 DIAGNOSIS — R51.9 ACUTE NONINTRACTABLE HEADACHE, UNSPECIFIED HEADACHE TYPE: ICD-10-CM

## 2025-01-08 DIAGNOSIS — R06.09 DYSPNEA ON EXERTION: ICD-10-CM

## 2025-01-08 DIAGNOSIS — E78.2 MIXED HYPERLIPIDEMIA: ICD-10-CM

## 2025-01-08 DIAGNOSIS — R07.2 PRECORDIAL PAIN: ICD-10-CM

## 2025-01-08 LAB
ALBUMIN SERPL BCG-MCNC: 4.1 G/DL (ref 3.5–5)
ALP SERPL-CCNC: 100 U/L (ref 34–104)
ALT SERPL W P-5'-P-CCNC: 14 U/L (ref 7–52)
ANION GAP SERPL CALCULATED.3IONS-SCNC: 7 MMOL/L (ref 4–13)
AST SERPL W P-5'-P-CCNC: 14 U/L (ref 13–39)
BILIRUB SERPL-MCNC: 0.61 MG/DL (ref 0.2–1)
BUN SERPL-MCNC: 25 MG/DL (ref 5–25)
CALCIUM SERPL-MCNC: 9.5 MG/DL (ref 8.4–10.2)
CHLORIDE SERPL-SCNC: 108 MMOL/L (ref 96–108)
CHOLEST SERPL-MCNC: 104 MG/DL (ref ?–200)
CO2 SERPL-SCNC: 25 MMOL/L (ref 21–32)
CREAT SERPL-MCNC: 0.88 MG/DL (ref 0.6–1.3)
ERYTHROCYTE [DISTWIDTH] IN BLOOD BY AUTOMATED COUNT: 14.7 % (ref 11.6–15.1)
GFR SERPL CREATININE-BSD FRML MDRD: 77 ML/MIN/1.73SQ M
GLUCOSE SERPL-MCNC: 152 MG/DL (ref 65–140)
HCT VFR BLD AUTO: 38.6 % (ref 36.5–49.3)
HDLC SERPL-MCNC: 47 MG/DL
HGB BLD-MCNC: 12.7 G/DL (ref 12–17)
INR PPP: 1.06 (ref 0.85–1.19)
LDLC SERPL CALC-MCNC: 40 MG/DL (ref 0–100)
MCH RBC QN AUTO: 33.1 PG (ref 26.8–34.3)
MCHC RBC AUTO-ENTMCNC: 32.9 G/DL (ref 31.4–37.4)
MCV RBC AUTO: 101 FL (ref 82–98)
NONHDLC SERPL-MCNC: 57 MG/DL
PLATELET # BLD AUTO: 310 THOUSANDS/UL (ref 149–390)
PMV BLD AUTO: 10.6 FL (ref 8.9–12.7)
POTASSIUM SERPL-SCNC: 4.2 MMOL/L (ref 3.5–5.3)
PROT SERPL-MCNC: 6.7 G/DL (ref 6.4–8.4)
PROTHROMBIN TIME: 14.5 SECONDS (ref 12.3–15)
RBC # BLD AUTO: 3.84 MILLION/UL (ref 3.88–5.62)
SODIUM SERPL-SCNC: 140 MMOL/L (ref 135–147)
TRIGL SERPL-MCNC: 84 MG/DL (ref ?–150)
WBC # BLD AUTO: 14.9 THOUSAND/UL (ref 4.31–10.16)

## 2025-01-08 PROCEDURE — 85027 COMPLETE CBC AUTOMATED: CPT

## 2025-01-08 PROCEDURE — 85610 PROTHROMBIN TIME: CPT

## 2025-01-08 PROCEDURE — 80053 COMPREHEN METABOLIC PANEL: CPT

## 2025-01-08 PROCEDURE — 80061 LIPID PANEL: CPT

## 2025-01-08 PROCEDURE — 36415 COLL VENOUS BLD VENIPUNCTURE: CPT

## 2025-01-10 ENCOUNTER — TELEPHONE (OUTPATIENT)
Age: 85
End: 2025-01-10

## 2025-01-10 DIAGNOSIS — J01.90 ACUTE BACTERIAL SINUSITIS: Primary | ICD-10-CM

## 2025-01-10 DIAGNOSIS — B96.89 ACUTE BACTERIAL SINUSITIS: Primary | ICD-10-CM

## 2025-01-10 RX ORDER — BENZONATATE 100 MG/1
100 CAPSULE ORAL 3 TIMES DAILY PRN
Qty: 20 CAPSULE | Refills: 0 | Status: SHIPPED | OUTPATIENT
Start: 2025-01-10

## 2025-01-10 NOTE — TELEPHONE ENCOUNTER
As a follow-up, a second attempt has been made for outreach via fax to facility. Please see Contacts section for details.    Thank you  Mohsen Sandy MA

## 2025-01-10 NOTE — TELEPHONE ENCOUNTER
Cough suppressant sent. Based on when patient was seen he should still have two days left of antibiotics to complete

## 2025-01-10 NOTE — TELEPHONE ENCOUNTER
Patient was seen on 1/6 medication is finished,ear is still clogged, cough has gotten better, but now has a dry cough.  Wondering if something else can be sent over to [facility] ?

## 2025-01-13 NOTE — TELEPHONE ENCOUNTER
As a final attempt, a third outreach has been made via telephone call to facility. Please see Contacts section for details. This encounter will be closed and completed by end of day. Should we receive the requested information because of previous outreach attempts, the requested patient's chart will be updated appropriately.     Thank you  Mohsen Sandy MA

## 2025-01-14 ENCOUNTER — HOSPITAL ENCOUNTER (OUTPATIENT)
Facility: HOSPITAL | Age: 85
Setting detail: OUTPATIENT SURGERY
Discharge: HOME | End: 2025-01-14
Attending: INTERNAL MEDICINE | Admitting: INTERNAL MEDICINE
Payer: MEDICARE

## 2025-01-14 VITALS
HEIGHT: 72 IN | WEIGHT: 188.71 LBS | HEART RATE: 75 BPM | TEMPERATURE: 97.8 F | BODY MASS INDEX: 25.56 KG/M2 | DIASTOLIC BLOOD PRESSURE: 55 MMHG | RESPIRATION RATE: 17 BRPM | OXYGEN SATURATION: 96 % | SYSTOLIC BLOOD PRESSURE: 102 MMHG

## 2025-01-14 DIAGNOSIS — R06.09 DYSPNEA ON EXERTION: ICD-10-CM

## 2025-01-14 DIAGNOSIS — R07.2 PRECORDIAL PAIN: ICD-10-CM

## 2025-01-14 PROCEDURE — 99152 MOD SED SAME PHYS/QHP 5/>YRS: CPT | Performed by: INTERNAL MEDICINE

## 2025-01-14 PROCEDURE — 93458 L HRT ARTERY/VENTRICLE ANGIO: CPT | Performed by: INTERNAL MEDICINE

## 2025-01-14 PROCEDURE — C1887 CATHETER, GUIDING: HCPCS | Performed by: INTERNAL MEDICINE

## 2025-01-14 PROCEDURE — 93571 IV DOP VEL&/PRESS C FLO 1ST: CPT | Performed by: INTERNAL MEDICINE

## 2025-01-14 PROCEDURE — 99153 MOD SED SAME PHYS/QHP EA: CPT | Performed by: INTERNAL MEDICINE

## 2025-01-14 PROCEDURE — C1769 GUIDE WIRE: HCPCS | Performed by: INTERNAL MEDICINE

## 2025-01-14 PROCEDURE — 93799 UNLISTED CV SVC/PROCEDURE: CPT | Performed by: INTERNAL MEDICINE

## 2025-01-14 PROCEDURE — C1894 INTRO/SHEATH, NON-LASER: HCPCS | Performed by: INTERNAL MEDICINE

## 2025-01-14 RX ORDER — FENTANYL CITRATE 50 UG/ML
INJECTION, SOLUTION INTRAMUSCULAR; INTRAVENOUS CODE/TRAUMA/SEDATION MEDICATION
Status: DISCONTINUED | OUTPATIENT
Start: 2025-01-14 | End: 2025-01-14 | Stop reason: HOSPADM

## 2025-01-14 RX ORDER — LIDOCAINE WITH 8.4% SOD BICARB 0.9%(10ML)
SYRINGE (ML) INJECTION CODE/TRAUMA/SEDATION MEDICATION
Status: DISCONTINUED | OUTPATIENT
Start: 2025-01-14 | End: 2025-01-14 | Stop reason: HOSPADM

## 2025-01-14 RX ORDER — SODIUM CHLORIDE 9 MG/ML
75 INJECTION, SOLUTION INTRAVENOUS CONTINUOUS
Status: DISPENSED | OUTPATIENT
Start: 2025-01-14 | End: 2025-01-14

## 2025-01-14 RX ORDER — MIDAZOLAM HYDROCHLORIDE 2 MG/2ML
INJECTION, SOLUTION INTRAMUSCULAR; INTRAVENOUS CODE/TRAUMA/SEDATION MEDICATION
Status: DISCONTINUED | OUTPATIENT
Start: 2025-01-14 | End: 2025-01-14 | Stop reason: HOSPADM

## 2025-01-14 RX ORDER — ASPIRIN 81 MG/1
81 TABLET, CHEWABLE ORAL DAILY
Status: DISCONTINUED | OUTPATIENT
Start: 2025-01-14 | End: 2025-01-14 | Stop reason: HOSPADM

## 2025-01-14 RX ORDER — HEPARIN SODIUM 1000 [USP'U]/ML
INJECTION, SOLUTION INTRAVENOUS; SUBCUTANEOUS CODE/TRAUMA/SEDATION MEDICATION
Status: DISCONTINUED | OUTPATIENT
Start: 2025-01-14 | End: 2025-01-14 | Stop reason: HOSPADM

## 2025-01-14 RX ORDER — NITROGLYCERIN 20 MG/100ML
INJECTION INTRAVENOUS CODE/TRAUMA/SEDATION MEDICATION
Status: DISCONTINUED | OUTPATIENT
Start: 2025-01-14 | End: 2025-01-14 | Stop reason: HOSPADM

## 2025-01-14 RX ORDER — VERAPAMIL HCL 2.5 MG/ML
AMPUL (ML) INTRAVENOUS CODE/TRAUMA/SEDATION MEDICATION
Status: DISCONTINUED | OUTPATIENT
Start: 2025-01-14 | End: 2025-01-14 | Stop reason: HOSPADM

## 2025-01-14 RX ORDER — ADENOSINE 3 MG/ML
INJECTION, SOLUTION INTRAVENOUS
Status: DISCONTINUED | OUTPATIENT
Start: 2025-01-14 | End: 2025-01-14 | Stop reason: HOSPADM

## 2025-01-14 RX ADMIN — SODIUM CHLORIDE 75 ML/HR: 0.9 INJECTION, SOLUTION INTRAVENOUS at 09:21

## 2025-01-14 RX ADMIN — ASPIRIN 81 MG: 81 TABLET, CHEWABLE ORAL at 09:21

## 2025-01-14 NOTE — DISCHARGE INSTR - AVS FIRST PAGE
1. Please see the post cardiac catheterization dishcarge instructions.   No heavy lifting, greater than 10 lbs. or strenuous  activity for 48 hrs.    2.Remove band aid tomorrow.  Shower and wash area- wrist gently with soap and water- beginning tomorrow. Rinse and pat dry.  Apply new water seal band aid.  Repeat this process for 5 days. No powders, creams lotions or antibiotic ointments  for 5 days.  No tub baths, hot tubs or swimming for 5 days.     3. Please call our office ([phone]) if you have any fever, redness, swelling, discharge from your wrist access site.    4.No driving for 1 day

## 2025-01-14 NOTE — INTERVAL H&P NOTE
Update: (This section must be completed if the H&P was completed greater than 24 hrs to procedure or admission)    H&P reviewed. After examining the patient, I find no changed to the H&P since it had been written.    Risks, benefits, and alternatives of cardiac catheterization including but not limited to risk of infection, vascular injury, renal failure, bleeding, myocardial infarction, stroke, and death were discussed at length with patient. Patient understands the risks and benefits and wishes to proceed.     Vitals:    01/14/25 0848   BP: 134/63   Pulse: 84   Resp: 13   Temp: 98.1 °F (36.7 °C)   SpO2: 97%        Patient re-evaluated. Accept as history and physical.    OSBALDO Du/January 14, 2025/9:31 AM

## 2025-01-21 ENCOUNTER — HOSPITAL ENCOUNTER (OUTPATIENT)
Age: 85
Discharge: HOME | End: 2025-01-21
Payer: MEDICARE

## 2025-01-21 VITALS — HEIGHT: 72 IN | BODY MASS INDEX: 25.47 KG/M2 | WEIGHT: 188 LBS

## 2025-01-21 DIAGNOSIS — Z15.09 BRCA1 GENE MUTATION POSITIVE: ICD-10-CM

## 2025-01-21 DIAGNOSIS — Z12.31 ENCOUNTER FOR SCREENING MAMMOGRAM FOR MALIGNANT NEOPLASM OF BREAST: ICD-10-CM

## 2025-01-21 DIAGNOSIS — Z15.01 BRCA1 GENE MUTATION POSITIVE: ICD-10-CM

## 2025-01-21 DIAGNOSIS — M81.0 AGE-RELATED OSTEOPOROSIS WITHOUT CURRENT PATHOLOGICAL FRACTURE: ICD-10-CM

## 2025-01-21 PROCEDURE — 77063 BREAST TOMOSYNTHESIS BI: CPT

## 2025-01-21 PROCEDURE — 77080 DXA BONE DENSITY AXIAL: CPT

## 2025-01-21 PROCEDURE — 77067 SCR MAMMO BI INCL CAD: CPT

## 2025-01-22 ENCOUNTER — RESULTS FOLLOW-UP (OUTPATIENT)
Age: 85
End: 2025-01-22

## 2025-01-23 ENCOUNTER — TELEPHONE (OUTPATIENT)
Age: 85
End: 2025-01-23

## 2025-01-23 NOTE — TELEPHONE ENCOUNTER
Caller: Dante     Doctor: Mekhi    Reason for call: Pt would like Dr. Gaming to go over his meds and see if there is anything he can come off of. Please give pt a call at    Call back#: [phone]

## 2025-01-24 NOTE — TELEPHONE ENCOUNTER
----- Message from Severino Hilton MD sent at 1/23/2025  5:34 PM EST -----  Can decrease the amount of vitamin D he is taking - looks like he's taking 8000 units a day. Can decrease to 2000 daily

## 2025-02-07 ENCOUNTER — OFFICE VISIT (OUTPATIENT)
Dept: PODIATRY | Facility: CLINIC | Age: 85
End: 2025-02-07
Payer: MEDICARE

## 2025-02-07 VITALS — HEIGHT: 72 IN | BODY MASS INDEX: 25.47 KG/M2 | WEIGHT: 188 LBS

## 2025-02-07 DIAGNOSIS — B35.1 ONYCHOMYCOSIS: Primary | ICD-10-CM

## 2025-02-07 DIAGNOSIS — I73.9 PERIPHERAL VASCULAR DISEASE (HCC): ICD-10-CM

## 2025-02-07 DIAGNOSIS — G60.9 IDIOPATHIC NEUROPATHY: ICD-10-CM

## 2025-02-07 PROCEDURE — 11721 DEBRIDE NAIL 6 OR MORE: CPT | Performed by: PODIATRIST

## 2025-02-07 PROCEDURE — RECHECK: Performed by: PODIATRIST

## 2025-02-07 NOTE — PROGRESS NOTES
Name: Dante Mckinney      : 1940      MRN: [id number]  Encounter Provider: Ernst Anton DPM  Encounter Date: 2025   Encounter department: [facility] [place]  :  Assessment & Plan  Onychomycosis       Debride mycotic nails and thin the nail plates x 10 with the use of a nail nipper manually and an electric Dremel bur was used to reduce the thickness of the nail beds and smoothed the distal aspect of the nails.   Idiopathic neuropathy         Peripheral vascular disease (HCC)         Discussed proper shoe gear, daily inspections of feet, and general foot health with patient. Patient has Q9  findings and is recommended for at risk foot care every 9-10 weeks.    Patients most recent complete clinical foot exam was on: 11/15/2024      Return in about 4 months (around 2025).     History of Present Illness   HPI  Dante Mckinney is a 84 y.o. male who presents with chief complaint of painful thick nails on both feet.  He has peripheral neuropathy along with peripheral vascular disease.Patient presents for at-risk foot care.  Patient has no acute concerns today.  Patient has significant lower extremity risk due to neuropathy, parasthesia, edema, and trophic skin changes to the lower extremity.   History obtained from: patient    Review of Systems  Medical History Reviewed by provider this encounter:     .  Current Outpatient Medications on File Prior to Visit   Medication Sig Dispense Refill   • albuterol (Ventolin HFA) 90 mcg/act inhaler Inhale 2 puffs every 6 (six) hours as needed for wheezing 18 g 5   • amLODIPine (NORVASC) 2.5 mg tablet Take 2.5 mg by mouth daily     • aspirin (ECOTRIN LOW STRENGTH) 81 mg EC tablet Take 81 mg by mouth daily     • Calcium Carbonate Antacid 400 MG CHEW Chew     • Cholecalciferol 100 MCG (4000 UT) TABS Take 2 tablets by mouth daily     • fluticasone (FLONASE) 50 mcg/act nasal spray 2 sprays into each nostril daily     • guaiFENesin (MUCINEX) 600 mg 12 hr  tablet Take 1 tablet by mouth 2 (two) times a day     • loratadine (CLARITIN) 10 mg tablet Take 10 mg by mouth     • losartan (COZAAR) 25 mg tablet Take 25 mg by mouth     • rosuvastatin (CRESTOR) 10 MG tablet Take 10 mg by mouth     • benzonatate (TESSALON PERLES) 100 mg capsule Take 1 capsule (100 mg total) by mouth 3 (three) times a day as needed for cough 20 capsule 0   • methylPREDNISolone 4 MG tablet therapy pack Use as directed on package 21 each 0   • metoprolol succinate (TOPROL-XL) 25 mg 24 hr tablet take 1/2 (one-half) tablet by mouth once daily       No current facility-administered medications on file prior to visit.      Social History     Tobacco Use   • Smoking status: Former     Current packs/day: 0.00     Average packs/day: 0.5 packs/day for 30.7 years (15.4 ttl pk-yrs)     Types: Cigarettes     Start date: 1964     Quit date: 10/8/1994     Years since quittin.3     Passive exposure: Never   • Smokeless tobacco: Never   Vaping Use   • Vaping status: Never Used   Substance and Sexual Activity   • Alcohol use: Yes     Alcohol/week: 40.0 standard drinks of alcohol     Types: 40 Cans of beer per week   • Drug use: Never   • Sexual activity: Not Currently     Partners: Female     Birth control/protection: Male Sterilization        Objective   Ht 6' (1.829 m)   Wt 85.3 kg (188 lb)   BMI 25.50 kg/m²      Physical Exam  Vascular status is a faint 1/4 DP PT negative digital hair delayed capillary refill bilaterally.  Capillary refill is approximately 3 to 4 seconds.    Derm nails are brittle elongated hypertrophic yellow-brown discoloration with subungual debris x 10.  There is an increased thickness and the nails are approximately 2 to 3 mm.  The fifth toenail on the left foot is lifted from the nailbed with dry eschar present on the base.  No damage to the nailbed is noted.    Administrative Statements   I have spent a total time of 15 minutes in caring for this patient on the day of the  visit/encounter including Risks and benefits of tx options, Instructions for management, Patient and family education, Importance of tx compliance, Risk factor reductions, Counseling / Coordination of care, and Documenting in the medical record.

## 2025-02-11 ENCOUNTER — OFFICE VISIT (OUTPATIENT)
Dept: OBGYN CLINIC | Facility: CLINIC | Age: 85
End: 2025-02-11
Payer: MEDICARE

## 2025-02-11 VITALS — HEIGHT: 72 IN | WEIGHT: 188 LBS | BODY MASS INDEX: 25.47 KG/M2

## 2025-02-11 DIAGNOSIS — M17.11 PRIMARY OSTEOARTHRITIS OF RIGHT KNEE: Primary | ICD-10-CM

## 2025-02-11 PROCEDURE — 99213 OFFICE O/P EST LOW 20 MIN: CPT | Performed by: ORTHOPAEDIC SURGERY

## 2025-02-11 NOTE — PROGRESS NOTES
Name: Dante Mckinney      : 1940       MRN: [id number]   Encounter Provider: Pedro Beavers MD   Encounter Date: 25  Encounter department: [facility] [place]         Assessment & Plan  Primary osteoarthritis of right knee  Discussed conservative treatment with patient at length  Weight bearing as tolerated right lower extremity   Begin to maintain hinged knee for support   Offered patient Toradol injection. Patient accepted and tolerated well.    Over the counter analgesics as needed / directed   Ice / heat as directed   Follow up with Dr. Inman to discuss TKA, referral provided  Orders:    Ambulatory Referral to Orthopedic Surgery; Future         To Do Next Visit:  F/u with Dr. Inman    _____________________________________________________  CHIEF COMPLAINT:  Chief Complaint   Patient presents with    Right Knee - Follow-up         SUBJECTIVE:  Dante Mckinney is a 84 y.o. male who presents for follow up evaluation of right knee osteoarthritis s/p Toradol injection on 10/17/2024. He admits mild relief from Toradol injection. Minimal relief from previous visco or CSI. Patient would like to discuss TKA. He wears hinge knee brace with good symptom relief. Managing symptoms with OTC analgesics. History of right foot drop after a lumbar spine injection.    PAST MEDICAL HISTORY:  Past Medical History:   Diagnosis Date    Cancer (HCC)     Colorectal cancer (HCC)     Macular degeneration     both eyes    Melanoma in situ of cheek (HCC)     Osteoarthritis        PAST SURGICAL HISTORY:  Past Surgical History:   Procedure Laterality Date    CARDIAC CATHETERIZATION Left 2025    Procedure: Cardiac Left Heart Cath;  Surgeon: Tin Lara MD;  Location: [facility];  Service: Cardiology    CARDIAC CATHETERIZATION N/A 2025    Procedure: Cardiac Coronary Angiogram;  Surgeon: Tin Lara MD;  Location: [facility];  Service: Cardiology    CARDIAC  CATHETERIZATION  2025    Procedure: Left Heart Catherization;  Surgeon: Tin Lara MD;  Location: [facility];  Service: Cardiology    CARDIAC CATHETERIZATION N/A 2025    Procedure: Cardiac FFR/IFR;  Surgeon: Tin Lara MD;  Location: [facility];  Service: Cardiology    CATARACT EXTRACTION Bilateral     2015    COLONOSCOPY      JOINT REPLACEMENT  2009    LAPAROSCOPIC COLON RESECTION      PROSTATE BIOPSY       and     REPLACEMENT TOTAL KNEE Left     2009    REPLACEMENT TOTAL KNEE Right     2016    TOTAL HIP ARTHROPLASTY             FAMILY HISTORY:  Family History   Problem Relation Age of Onset    Cancer Mother     Heart disease Father     Breast cancer Sister     Cancer Sister         Breast & Colon    Colorectal Cancer Maternal Grandmother     Hodgkin's lymphoma Brother     Cancer Brother        SOCIAL HISTORY:  Social History     Tobacco Use    Smoking status: Former     Current packs/day: 0.00     Average packs/day: 0.5 packs/day for 30.7 years (15.4 ttl pk-yrs)     Types: Cigarettes     Start date: 1964     Quit date: 10/8/1994     Years since quittin.3     Passive exposure: Never    Smokeless tobacco: Never   Vaping Use    Vaping status: Never Used   Substance Use Topics    Alcohol use: Yes     Alcohol/week: 40.0 standard drinks of alcohol     Types: 40 Cans of beer per week    Drug use: Never       MEDICATIONS:    Current Outpatient Medications:     albuterol (Ventolin HFA) 90 mcg/act inhaler, Inhale 2 puffs every 6 (six) hours as needed for wheezing, Disp: 18 g, Rfl: 5    amLODIPine (NORVASC) 2.5 mg tablet, Take 2.5 mg by mouth daily, Disp: , Rfl:     aspirin (ECOTRIN LOW STRENGTH) 81 mg EC tablet, Take 81 mg by mouth daily, Disp: , Rfl:     Calcium Carbonate Antacid 400 MG CHEW, Chew, Disp: , Rfl:     Cholecalciferol 100 MCG (4000 UT) TABS, Take 2 tablets by mouth daily, Disp: , Rfl:     fluticasone (FLONASE) 50 mcg/act nasal spray, 2 sprays into each  "nostril daily, Disp: , Rfl:     guaiFENesin (MUCINEX) 600 mg 12 hr tablet, Take 1 tablet by mouth 2 (two) times a day, Disp: , Rfl:     loratadine (CLARITIN) 10 mg tablet, Take 10 mg by mouth, Disp: , Rfl:     losartan (COZAAR) 25 mg tablet, Take 25 mg by mouth, Disp: , Rfl:     rosuvastatin (CRESTOR) 10 MG tablet, Take 10 mg by mouth, Disp: , Rfl:     benzonatate (TESSALON PERLES) 100 mg capsule, Take 1 capsule (100 mg total) by mouth 3 (three) times a day as needed for cough, Disp: 20 capsule, Rfl: 0    methylPREDNISolone 4 MG tablet therapy pack, Use as directed on package, Disp: 21 each, Rfl: 0    metoprolol succinate (TOPROL-XL) 25 mg 24 hr tablet, take 1/2 (one-half) tablet by mouth once daily, Disp: , Rfl:     ALLERGIES:  Allergies   Allergen Reactions    Balance B-50 Dizziness    Omeprazole Other (See Comments)    Prednisone Other (See Comments)    Simvastatin Myalgia       LABS:  HgA1c: No results found for: \"HGBA1C\"  BMP:   Lab Results   Component Value Date    CALCIUM 9.5 01/08/2025    K 4.2 01/08/2025    CO2 25 01/08/2025     01/08/2025    BUN 25 01/08/2025    CREATININE 0.88 01/08/2025     CBC: No components found for: \"CBC\"    _____________________________________________________  PHYSICAL EXAMINATION:  Vital signs: Ht 6' (1.829 m)   Wt 85.3 kg (188 lb)   BMI 25.50 kg/m²   General: No acute distress, awake and alert  Psychiatric: Mood and affect appear appropriate  HEENT: Trachea Midline, No torticollis, no apparent facial trauma  Cardiovascular: No audible murmurs; Extremities appear perfused  Pulmonary: No audible wheezing or stridor  Skin: No open lesions; see further details (if any) below    MUSCULOSKELETAL EXAMINATION:  Extremities:    Right Knee  Range of motion from 0 to 100.    There is mild crepitus with range of motion.   There is no effusion.    There is tenderness over the medial joint line.    There is 5/5 quadriceps strength and equal tone.    The patient is able to perform a " straight leg raise.    Varus stress testing reveals no instability at 0 and 30 degrees   Valgus stress testing reveals no instability at 0 and 30 degrees  The patient is neurovascular intact distally.        _____________________________________________________  STUDIES REVIEWED:  I personally reviewed the images obtained in office today and my independent interpretation is as follows:  No new images today      PROCEDURES PERFORMED:  Procedures      Scribe Attestation      I,:  Nahed Sanders am acting as a scribe while in the presence of the attending physician.:       I,:  Pedro Beavers MD personally performed the services described in this documentation    as scribed in my presence.:

## 2025-02-12 ENCOUNTER — OFFICE VISIT (OUTPATIENT)
Age: 85
End: 2025-02-12
Payer: MEDICARE

## 2025-02-12 VITALS
HEIGHT: 72 IN | SYSTOLIC BLOOD PRESSURE: 122 MMHG | DIASTOLIC BLOOD PRESSURE: 70 MMHG | HEART RATE: 72 BPM | BODY MASS INDEX: 25.38 KG/M2 | OXYGEN SATURATION: 99 % | WEIGHT: 187.4 LBS | TEMPERATURE: 94.8 F

## 2025-02-12 DIAGNOSIS — J44.9 CHRONIC OBSTRUCTIVE PULMONARY DISEASE, UNSPECIFIED COPD TYPE (HCC): ICD-10-CM

## 2025-02-12 DIAGNOSIS — R05.3 CHRONIC COUGH: Primary | ICD-10-CM

## 2025-02-12 DIAGNOSIS — K21.9 GASTROESOPHAGEAL REFLUX DISEASE WITHOUT ESOPHAGITIS: ICD-10-CM

## 2025-02-12 DIAGNOSIS — H61.23 BILATERAL IMPACTED CERUMEN: ICD-10-CM

## 2025-02-12 PROCEDURE — 99214 OFFICE O/P EST MOD 30 MIN: CPT

## 2025-02-12 PROCEDURE — 69210 REMOVE IMPACTED EAR WAX UNI: CPT

## 2025-02-12 PROCEDURE — G2211 COMPLEX E/M VISIT ADD ON: HCPCS

## 2025-02-12 RX ORDER — FAMOTIDINE 20 MG/1
20 TABLET, FILM COATED ORAL DAILY
Qty: 30 TABLET | Refills: 1 | Status: SHIPPED | OUTPATIENT
Start: 2025-02-12 | End: 2025-04-13

## 2025-02-12 RX ORDER — FAMOTIDINE 40 MG/1
40 TABLET, FILM COATED ORAL DAILY
Qty: 30 TABLET | Refills: 1 | Status: SHIPPED | OUTPATIENT
Start: 2025-02-12 | End: 2025-02-12

## 2025-02-12 NOTE — ASSESSMENT & PLAN NOTE
Past history of, Was seeing pulmonology in the past in [place]   Continue smoking cessation, quit 1994   O2 99% on RA  Lungs CTA bilaterally in all lobes   Complete PFT and follow up with pulmonology   Orders:    Complete PFT with post bronchodilator; Future    Ambulatory Referral to Pulmonology; Future

## 2025-02-12 NOTE — PROGRESS NOTES
Name: Dante Mckinney      : 1940      MRN: [id number]  Encounter Provider: Vince Quiñones PA-C  Encounter Date: 2025   Encounter department: [facility] [place]  :  Assessment & Plan  Chronic cough  2 months and stable severity, recommend pepcid to treat possible silent GERD  Begin mucinex DM otc following label instructions for 7-10 days     Reassured I do not suspect infection today, VSS, PE unremarkable   Was on medrol and augmentin 1 month ago and SOB, wheezing, sinus pressure resolved and did not come back, just has a lingering cough     Complete pft and follow up with pulmonology   F/u for any new or worsening symptoms   Orders:    famotidine (PEPCID) 20 mg tablet; Take 1 tablet (20 mg total) by mouth daily    Chronic obstructive pulmonary disease, unspecified COPD type (HCC)  Past history of, Was seeing pulmonology in the past in [place]   Continue smoking cessation, quit    O2 99% on RA  Lungs CTA bilaterally in all lobes   Complete PFT and follow up with pulmonology   Orders:    Complete PFT with post bronchodilator; Future    Ambulatory Referral to Pulmonology; Future    Bilateral impacted cerumen  Successful removal of cerumen impaction both ears. Muffled hearing improved  Orders:    Ear cerumen removal    Gastroesophageal reflux disease without esophagitis  Controlled without medications, is on calcium vitamin d supplement for pmh of osteoporosis   Orders:    famotidine (PEPCID) 20 mg tablet; Take 1 tablet (20 mg total) by mouth daily           History of Present Illness     Persistent cough for 2 months, dry but sometimes brings stuff up. Ears feel clogged  Is living with daughter currently and thinks maybe allergy or the dogs but is on Claritin daily     Recently had cardiac cath which was unremarkable, is continue with current medications         Review of Systems   Constitutional:  Negative for activity change, diaphoresis, fatigue and fever.   HENT:   Positive for hearing loss (resolved with cerumen removal). Negative for congestion, ear discharge, ear pain, sinus pressure, sinus pain, sore throat, trouble swallowing and voice change.    Eyes: Negative.    Respiratory:  Positive for cough. Negative for chest tightness, shortness of breath and wheezing.    Cardiovascular:  Negative for chest pain, palpitations and leg swelling.   Gastrointestinal: Negative.    Endocrine: Negative.  Negative for polydipsia, polyphagia and polyuria.   Genitourinary: Negative.  Negative for dysuria, flank pain, frequency, hematuria and urgency.   Musculoskeletal: Negative.  Negative for back pain, joint swelling, neck pain and neck stiffness.   Skin: Negative.    Neurological: Negative.  Negative for dizziness, weakness, light-headedness and headaches.   Hematological:  Negative for adenopathy.   Psychiatric/Behavioral: Negative.  Negative for confusion and sleep disturbance. The patient is not nervous/anxious.        Objective   /70 (Patient Position: Sitting, Cuff Size: Standard)   Pulse 72   Temp (!) 94.8 °F (34.9 °C)   Ht 6' (1.829 m)   Wt 85 kg (187 lb 6.4 oz)   SpO2 99%   BMI 25.42 kg/m²      Physical Exam  Vitals and nursing note reviewed.   Constitutional:       General: He is not in acute distress.     Appearance: He is normal weight. He is not ill-appearing, toxic-appearing or diaphoretic.   HENT:      Head: Normocephalic and atraumatic.      Right Ear: External ear normal. There is impacted cerumen.      Left Ear: External ear normal. There is impacted cerumen.      Nose: Nose normal.      Mouth/Throat:      Mouth: Mucous membranes are moist.      Pharynx: No oropharyngeal exudate or posterior oropharyngeal erythema.   Eyes:      General: No scleral icterus.        Right eye: No discharge.         Left eye: No discharge.      Extraocular Movements: Extraocular movements intact.      Conjunctiva/sclera: Conjunctivae normal.   Cardiovascular:      Rate and  "Rhythm: Normal rate and regular rhythm.      Heart sounds: No murmur heard.  Pulmonary:      Effort: Pulmonary effort is normal. No respiratory distress.      Breath sounds: Normal breath sounds. No wheezing or rales.   Musculoskeletal:      Cervical back: Normal range of motion and neck supple.      Right lower leg: No edema.      Left lower leg: No edema.   Skin:     Findings: No rash.   Neurological:      Mental Status: He is alert. Mental status is at baseline.   Psychiatric:         Mood and Affect: Mood normal.         Behavior: Behavior normal.         Thought Content: Thought content normal.         Judgment: Judgment normal.     Ear cerumen removal    Date/Time: 2/12/2025 2:20 PM    Performed by: Vince Quiñones PA-C  Authorized by: Vince Quiñones PA-C  Universal Protocol:  procedure performed by consultantConsent: Verbal consent obtained.  Risks and benefits: risks, benefits and alternatives were discussed  Consent given by: patient  Patient understanding: patient states understanding of the procedure being performed  Patient consent: the patient's understanding of the procedure matches consent given  Procedure consent: procedure consent matches procedure scheduled  Patient identity confirmed: verbally with patient    Patient location:  Clinic  Procedure details:     Local anesthetic:  None    Location:  Both ears    Procedure type: curette      Approach:  External  Post-procedure details:     Complication:  None    Hearing quality:  Improved    Patient tolerance of procedure:  Tolerated well, no immediate complications      Portions of the record may have been created with voice recognition software. Occasional wrong word or \"sound a like\" substitutions may have occurred due to the inherent limitations of voice recognition software. Read the chart carefully and recognize, using context, where substitutions have occurred.    "

## 2025-02-12 NOTE — PATIENT INSTRUCTIONS
Mucinex DM following label instructions every 12 hrs for 7-10 days   Pepcid once daily 20mg for 3-4 weeks

## 2025-02-12 NOTE — ASSESSMENT & PLAN NOTE
Controlled without medications, is on calcium vitamin d supplement for pmh of osteoporosis   Orders:    famotidine (PEPCID) 20 mg tablet; Take 1 tablet (20 mg total) by mouth daily

## 2025-02-15 NOTE — ASSESSMENT & PLAN NOTE
Discussed conservative treatment with patient at length  Weight bearing as tolerated right lower extremity   Begin to maintain hinged knee for support   Offered patient Toradol injection. Patient accepted and tolerated well.    Over the counter analgesics as needed / directed   Ice / heat as directed   Follow up with Dr. Inman to discuss TKA, referral provided  Orders:    Ambulatory Referral to Orthopedic Surgery; Future

## 2025-02-17 NOTE — PROGRESS NOTES
Assessment:  1. Spinal stenosis of lumbar region, unspecified whether neurogenic claudication present    2. Lumbar radiculopathy    3. Right foot drop        Plan:    This is an 84-year-old male returns her office following right SI joint injection with minimal relief.  However right L5 TFESI provided more significant relief.  Intermittent right leg shooting pain has improved.  However he still complains of significant right calf and foot pain that is only notable at nighttime.  He denies ambulatory calf pain.  Therefore suspicion for circulatory issue is low.    Still continues to have notable right foot dorsiflexion weakness and we will look into what is going on with his MAFO.  He recently had a fall on ice.    I did discuss trial of Lyrica 25 mg in the evening and at bedtime to help with his nighttime neuropathic symptoms.  If a trial of this is unhelpful, may consider repeat right L5 TFESI.  He will give us an update in 2 weeks with how he is doing with the medication.  At that time we can make adjustments if necessary.    [place] Prescription Drug Monitoring Program report was reviewed and was appropriate     Complete risks and benefits including bleeding, infection, tissue reaction, nerve injury and allergic reaction were discussed. The approach was demonstrated using models and literature was provided. Verbal and written consent was obtained.    My impressions and treatment recommendations were discussed in detail with the patient who verbalized understanding and had no further questions.  Discharge instructions were provided. I personally saw and examined the patient and I agree with the above discussed plan of care.    No orders of the defined types were placed in this encounter.    New Medications Ordered This Visit   Medications    pregabalin (LYRICA) 25 mg capsule     Sig: Take 1 capsule (25 mg total) by mouth 2 (two) times a day Start by taking 1 pill at bedtime for 2 days. Then increase to 1 pill  in the evening and pill at bedtime thereafter.     Dispense:  60 capsule     Refill:  0       History of Present Illness:  Dante Mckinney is a 84 y.o. male who presents for a follow up office visit in regards to Leg Pain (Right side calf down to the bottom of the foot).   The patient’s current symptoms include intermittent right calf pain radiating into the foot that is worse at night.    He is status post right SI joint injection as well as right L5 TFESI.  Reports TFESI has helped with shooting pain in the right leg.    Initially his complaints were pain in the right SI region with intermittent pain in the right leg shooting and lasting for seconds at a time.    I have personally reviewed and/or updated the patient's past medical history, past surgical history, family history, social history, current medications, allergies, and vital signs today.     Review of Systems   Musculoskeletal:         Pain in Extremity Right lower leg       Patient Active Problem List   Diagnosis    Colorectal cancer (HCC)    History of colon resection    Prostate cancer (HCC)    Malignant melanoma of face excluding eyelid, nose, lip, and ear (HCC)    Macular degeneration    BRCA1 gene mutation positive    Chronic obstructive lung disease (HCC)    Gastroesophageal reflux disease    Mixed hyperlipidemia    Obstructive sleep apnea syndrome    Osteoporosis    Primary hypertension    Primary osteoarthritis of right knee    Right foot drop       Past Medical History:   Diagnosis Date    Cancer (HCC)     Colorectal cancer (HCC)     Macular degeneration     both eyes    Melanoma in situ of cheek (HCC)     Osteoarthritis 2014       Past Surgical History:   Procedure Laterality Date    CARDIAC CATHETERIZATION Left 1/14/2025    Procedure: Cardiac Left Heart Cath;  Surgeon: Tin Lara MD;  Location: [facility];  Service: Cardiology    CARDIAC CATHETERIZATION N/A 1/14/2025    Procedure: Cardiac Coronary Angiogram;  Surgeon: Tin Lara  MD;  Location: [facility];  Service: Cardiology    CARDIAC CATHETERIZATION  2025    Procedure: Left Heart Catherization;  Surgeon: Tin Lara MD;  Location: [facility];  Service: Cardiology    CARDIAC CATHETERIZATION N/A 2025    Procedure: Cardiac FFR/IFR;  Surgeon: Tin Lara MD;  Location: [facility];  Service: Cardiology    CATARACT EXTRACTION Bilateral     2015    COLONOSCOPY      JOINT REPLACEMENT      LAPAROSCOPIC COLON RESECTION      PROSTATE BIOPSY       and     REPLACEMENT TOTAL KNEE Left     2009    REPLACEMENT TOTAL KNEE Right     2016    TOTAL HIP ARTHROPLASTY             Family History   Problem Relation Age of Onset    Cancer Mother     Heart disease Father     Breast cancer Sister     Cancer Sister         Breast & Colon    Colorectal Cancer Maternal Grandmother     Hodgkin's lymphoma Brother     Cancer Brother        Social History     Occupational History    Not on file   Tobacco Use    Smoking status: Former     Current packs/day: 0.00     Average packs/day: 0.5 packs/day for 30.7 years (15.4 ttl pk-yrs)     Types: Cigarettes     Start date: 1964     Quit date: 10/8/1994     Years since quittin.3     Passive exposure: Never    Smokeless tobacco: Never   Vaping Use    Vaping status: Never Used   Substance and Sexual Activity    Alcohol use: Yes     Alcohol/week: 40.0 standard drinks of alcohol     Types: 40 Cans of beer per week    Drug use: Never    Sexual activity: Not Currently     Partners: Female     Birth control/protection: Male Sterilization       Current Outpatient Medications on File Prior to Visit   Medication Sig    albuterol (Ventolin HFA) 90 mcg/act inhaler Inhale 2 puffs every 6 (six) hours as needed for wheezing    amLODIPine (NORVASC) 2.5 mg tablet Take 2.5 mg by mouth daily    Calcium Carbonate Antacid 400 MG CHEW Chew    Cholecalciferol 100 MCG (4000 UT) TABS Take 2 tablets by mouth daily    famotidine (PEPCID) 20  mg tablet Take 1 tablet (20 mg total) by mouth daily    fluticasone (FLONASE) 50 mcg/act nasal spray 2 sprays into each nostril daily    loratadine (CLARITIN) 10 mg tablet Take 10 mg by mouth    losartan (COZAAR) 25 mg tablet Take 25 mg by mouth    metoprolol succinate (TOPROL-XL) 25 mg 24 hr tablet take 1/2 (one-half) tablet by mouth once daily    rosuvastatin (CRESTOR) 10 MG tablet Take 10 mg by mouth     No current facility-administered medications on file prior to visit.       Allergies   Allergen Reactions    Balance B-50 Dizziness    Omeprazole Other (See Comments)    Prednisone Other (See Comments)    Simvastatin Myalgia       Physical Exam:    Ht 6' (1.829 m)   Wt 85 kg (187 lb 6.3 oz)   BMI 25.41 kg/m²     Constitutional:normal, well developed, well nourished, alert, in no distress and non-toxic and no overt pain behavior.  Eyes:anicteric  HEENT:grossly intact  Neck:supple, symmetric, trachea midline and no masses   Pulmonary:even and unlabored  Cardiovascular:No edema or pitting edema present  Skin:Normal without rashes or lesions and well hydrated  Psychiatric:Mood and affect appropriate  Neurologic:Cranial Nerves II-XII grossly intact  Musculoskeletal:normal    Imaging

## 2025-02-19 ENCOUNTER — APPOINTMENT (EMERGENCY)
Dept: RADIOLOGY | Facility: HOSPITAL | Age: 85
End: 2025-02-19
Payer: MEDICARE

## 2025-02-19 ENCOUNTER — APPOINTMENT (EMERGENCY)
Dept: CT IMAGING | Facility: HOSPITAL | Age: 85
End: 2025-02-19
Payer: MEDICARE

## 2025-02-19 ENCOUNTER — HOSPITAL ENCOUNTER (EMERGENCY)
Facility: HOSPITAL | Age: 85
Discharge: HOME | End: 2025-02-19
Attending: EMERGENCY MEDICINE | Admitting: EMERGENCY MEDICINE
Payer: MEDICARE

## 2025-02-19 VITALS
DIASTOLIC BLOOD PRESSURE: 74 MMHG | HEART RATE: 100 BPM | TEMPERATURE: 97.5 F | OXYGEN SATURATION: 94 % | SYSTOLIC BLOOD PRESSURE: 114 MMHG | RESPIRATION RATE: 18 BRPM
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
## 2025-02-19 DIAGNOSIS — R91.1 PULMONARY NODULE: ICD-10-CM

## 2025-02-19 DIAGNOSIS — S00.81XA ABRASION OF FACE, INITIAL ENCOUNTER: ICD-10-CM

## 2025-02-19 PROCEDURE — 72125 CT NECK SPINE W/O DYE: CPT

## 2025-02-19 PROCEDURE — 99284 EMERGENCY DEPT VISIT MOD MDM: CPT

## 2025-02-19 PROCEDURE — 90471 IMMUNIZATION ADMIN: CPT

## 2025-02-19 PROCEDURE — 71045 X-RAY EXAM CHEST 1 VIEW: CPT

## 2025-02-19 PROCEDURE — 70450 CT HEAD/BRAIN W/O DYE: CPT

## 2025-02-19 PROCEDURE — 90715 TDAP VACCINE 7 YRS/> IM: CPT

## 2025-02-19 RX ORDER — GINSENG 100 MG
1 CAPSULE ORAL ONCE
Status: COMPLETED | OUTPATIENT
Start: 2025-02-19 | End: 2025-02-19

## 2025-02-19 RX ADMIN — BACITRACIN ZINC 1 LARGE APPLICATION: 500 OINTMENT TOPICAL at 13:11

## 2025-02-19 RX ADMIN — TETANUS TOXOID, REDUCED DIPHTHERIA TOXOID AND ACELLULAR PERTUSSIS VACCINE, ADSORBED 0.5 ML: 5; 2.5; 8; 8; 2.5 SUSPENSION INTRAMUSCULAR at 13:10

## 2025-02-19 NOTE — DISCHARGE INSTRUCTIONS
Tylenol/Motrin  Clean wounds daily  Follow up with primary care provider  Return to ER if symptoms worsen or signs of infection

## 2025-02-19 NOTE — ED PROVIDER NOTES
Time reflects when diagnosis was documented in both MDM as applicable and the Disposition within this note       Time User Action Codes Description Comment    2/19/2025 12:59 PM Mary Davis Add [W19.XXXA] Fall, initial encounter     2/19/2025  1:01 PM Mary Davis Add [S00.81XA] Abrasion of face, initial encounter     2/19/2025  4:18 PM Mary Davis Add [R91.1] Pulmonary nodule           ED Disposition       ED Disposition   Discharge    Condition   Stable    Date/Time   Wed Feb 19, 2025 12:59 PM    Comment   Dante Mckinney discharge to home/self care.                   Assessment & Plan       Medical Decision Making  Called trauma evaluation per provider discretion due to mechanism of injury.  Patient did not have loss of consciousness and patient self discontinued antiplatelets.  Given workup, exam and history low suspicion for intracranial hemorrhage or trauma, with carotid or vertebral artery dissection, intrathoracic trauma(pulmonary contusion, blunt cardiac trauma, pneumothorax, hemothorax), intra-abdominal trauma (no liver, spleen or renal lacerations, doubt hollow visceral injury), extremity fracture, extremity dislocation, compartment syndrome.  CT head and C-spine negative for acute abnormalities.  Patient does have an incidental lung nodule, patient is aware of lung nodule.  Recommend following up with primary care for repeat imaging.  Patient has abrasions on face which wound care was discussed and patient verbalized understanding.  Advised to follow-up with primary care provider.  Return to the ER symptoms worsens or questions or concerns arise at home.      Amount and/or Complexity of Data Reviewed  Radiology: ordered. Decision-making details documented in ED Course.    Risk  OTC drugs.  Prescription drug management.        ED Course as of 02/19/25 1621   Wed Feb 19, 2025   1255 TRAUMA - CT head wo contrast  No acute intracranial pathology. Pansinus inflammatory changes           1255 TRAUMA - CT spine  cervical wo contrast  No cervical spine fracture or traumatic malalignment.               Medications   tetanus-diphtheria-acellular pertussis (BOOSTRIX) IM injection 0.5 mL (0.5 mL Intramuscular Given 2/19/25 1310)   bacitracin topical ointment 1 large application (1 large application Topical Given 2/19/25 1311)       ED Risk Strat Scores                            SBIRT 22yo+      Flowsheet Row Most Recent Value   Initial Alcohol Screen: US AUDIT-C     1. How often do you have a drink containing alcohol? 0 Filed at: 02/19/2025 1156   2. How many drinks containing alcohol do you have on a typical day you are drinking?  0 Filed at: 02/19/2025 1156   3b. FEMALE Any Age, or MALE 65+: How often do you have 4 or more drinks on one occassion? 0 Filed at: 02/19/2025 1156   Audit-C Score 0 Filed at: 02/19/2025 1156   DENZEL: How many times in the past year have you...    Used an illegal drug or used a prescription medication for non-medical reasons? Never Filed at: 02/19/2025 1156                            History of Present Illness       Chief Complaint   Patient presents with    Fall     Patient fell on ice and struck front of face, denies blood thinner or loc         Past Medical History:   Diagnosis Date    Cancer (HCC)     Colorectal cancer (HCC)     Macular degeneration     both eyes    Melanoma in situ of cheek (HCC)     Osteoarthritis 2014      Past Surgical History:   Procedure Laterality Date    CARDIAC CATHETERIZATION Left 1/14/2025    Procedure: Cardiac Left Heart Cath;  Surgeon: Tin Lara MD;  Location: [facility];  Service: Cardiology    CARDIAC CATHETERIZATION N/A 1/14/2025    Procedure: Cardiac Coronary Angiogram;  Surgeon: Tin Lara MD;  Location: [facility];  Service: Cardiology    CARDIAC CATHETERIZATION  1/14/2025    Procedure: Left Heart Catherization;  Surgeon: Tin Lara MD;  Location: [facility];  Service: Cardiology    CARDIAC CATHETERIZATION N/A 1/14/2025     Procedure: Cardiac FFR/IFR;  Surgeon: Tin Lara MD;  Location: [facility];  Service: Cardiology    CATARACT EXTRACTION Bilateral     2015    COLONOSCOPY      JOINT REPLACEMENT  2009    LAPAROSCOPIC COLON RESECTION      PROSTATE BIOPSY       and     REPLACEMENT TOTAL KNEE Left     2009    REPLACEMENT TOTAL KNEE Right         TOTAL HIP ARTHROPLASTY            Family History   Problem Relation Age of Onset    Cancer Mother     Heart disease Father     Breast cancer Sister     Cancer Sister         Breast & Colon    Colorectal Cancer Maternal Grandmother     Hodgkin's lymphoma Brother     Cancer Brother       Social History     Tobacco Use    Smoking status: Former     Current packs/day: 0.00     Average packs/day: 0.5 packs/day for 30.7 years (15.4 ttl pk-yrs)     Types: Cigarettes     Start date: 1964     Quit date: 10/8/1994     Years since quittin.3     Passive exposure: Never    Smokeless tobacco: Never   Vaping Use    Vaping status: Never Used   Substance Use Topics    Alcohol use: Yes     Alcohol/week: 40.0 standard drinks of alcohol     Types: 40 Cans of beer per week    Drug use: Never      E-Cigarette/Vaping    E-Cigarette Use Never User       E-Cigarette/Vaping Substances    Nicotine No     THC No     CBD No     Flavoring No     Other No     Unknown No       I have reviewed and agree with the history as documented.     -year-old male patient presents the ER for evaluation of fall.  Patient stated that he fell and struck the anterior aspect of his face.  There is noted multiple abrasions to the face.  Wounds are open and bleeding currently.  Patient denies loss of consciousness or blood thinners.  Patient denies blurred vision, double vision or loss of vision.  Tetanus is not up-to-date    GENERAL APPEARANCE: Patient in no acute distress.  HEENT: NCAT; PERRL, EOMs intact; Mucous membranes moist  NECK / BACK: No noted C-spine tenderness, thoracic or lumbar  CV: Regular rate  and rhythm; no murmur/gallops/rubs appreciated.  CHEST / LUNGS: Clear to auscultation; no wheezes/rales/rhonci.  ABD: NABS; soft; non-distended; non-tender.  EXT: +2 pulses bilaterally upper & lower extremities; no edema.  NEURO: GCS 15; no focal neurologic deficits; neurovascularly intact.  SKIN: Warm, dry and well perfused; no rash; no jaundice.  Small abrasions to face          Fall  Associated symptoms: no abdominal pain, no back pain, no chest pain, no seizures and no vomiting        Review of Systems   Constitutional:  Negative for chills and fever.   HENT:  Negative for ear pain and sore throat.    Eyes:  Negative for pain and visual disturbance.   Respiratory:  Negative for cough and shortness of breath.    Cardiovascular:  Negative for chest pain and palpitations.   Gastrointestinal:  Negative for abdominal pain and vomiting.   Genitourinary:  Negative for dysuria and hematuria.   Musculoskeletal:  Negative for arthralgias and back pain.   Skin:  Positive for wound. Negative for color change and rash.   Neurological:  Negative for seizures and syncope.   All other systems reviewed and are negative.          Objective       ED Triage Vitals [02/19/25 1155]   Temperature Pulse Blood Pressure Respirations SpO2 Patient Position - Orthostatic VS   (!) 96.3 °F (35.7 °C) 103 152/66 18 100 % Sitting      Temp Source Heart Rate Source BP Location FiO2 (%) Pain Score    Temporal Monitor Left arm -- --      Vitals      Date and Time Temp Pulse SpO2 Resp BP Pain Score FACES Pain Rating User   02/19/25 1300 97.5 °F (36.4 °C) 100 94 % 18 114/74 -- -- SM   02/19/25 1245 97.5 °F (36.4 °C) 103 94 % 18 126/66 -- -- SM   02/19/25 1230 97.5 °F (36.4 °C) 85 94 % 18 120/61 -- -- SM   02/19/25 1215 -- 89 94 % 18 121/68 -- --    02/19/25 1155 96.3 °F (35.7 °C) 103 100 % 18 152/66 -- -- KW            Physical Exam  Vitals and nursing note reviewed.   Constitutional:       General: He is not in acute distress.     Appearance: He  is well-developed.   HENT:      Head: Normocephalic and atraumatic.     Eyes:      Conjunctiva/sclera: Conjunctivae normal.   Cardiovascular:      Rate and Rhythm: Normal rate and regular rhythm.      Heart sounds: No murmur heard.  Pulmonary:      Effort: Pulmonary effort is normal. No respiratory distress.      Breath sounds: Normal breath sounds.   Abdominal:      Palpations: Abdomen is soft.      Tenderness: There is no abdominal tenderness.   Musculoskeletal:         General: No swelling.      Cervical back: Neck supple.   Skin:     General: Skin is warm and dry.      Capillary Refill: Capillary refill takes less than 2 seconds.   Neurological:      Mental Status: He is alert.   Psychiatric:         Mood and Affect: Mood normal.         Results Reviewed       None            XR Trauma chest portable   Final Interpretation by E. Alec Schoenberger, MD (02/19 1306)      Diminished lung markings the right lung base due to suspected bulla but pneumothorax) excluded..   Consider repeat x-ray or CT for further evaluation.      The study was marked in EPIC for immediate notification.      Workstation performed: [id number]         TRAUMA - CT head wo contrast   Final Interpretation by E. Alec Schoenberger, MD (02/19 1250)      No acute intracranial pathology. Pansinus inflammatory changes                  Workstation performed: [id number]         TRAUMA - CT spine cervical wo contrast   Final Interpretation by E. Alec Schoenberger, MD (02/19 1303)   Addendum (preliminary) 1 of 1 by E. Alec Schoenberger, MD (02/19 130)   ADDENDUM:      Degenerative changes. Left upper lobe nodule measuring 3 mm on image 79    series 302. Small calcified granuloma in the left upper lobe more    posteriorly.      Based on current Fleischner Society 2017 Guidelines on incidental    pulmonary nodule, patients with a known malignancy are at increased risk    of metastasis and should receive initial three month follow-up chest CT.      Study  marked in epic notification and follow-up.         Final      No cervical spine fracture or traumatic malalignment.                  Workstation performed: [id number]             Procedures    ED Medication and Procedure Management   Prior to Admission Medications   Prescriptions Last Dose Informant Patient Reported? Taking?   Calcium Carbonate Antacid 400 MG CHEW  Self Yes No   Sig: Chew   Cholecalciferol 100 MCG (4000 UT) TABS  Self Yes No   Sig: Take 2 tablets by mouth daily   albuterol (Ventolin HFA) 90 mcg/act inhaler  Self No No   Sig: Inhale 2 puffs every 6 (six) hours as needed for wheezing   amLODIPine (NORVASC) 2.5 mg tablet  Self Yes No   Sig: Take 2.5 mg by mouth daily   famotidine (PEPCID) 20 mg tablet   No No   Sig: Take 1 tablet (20 mg total) by mouth daily   fluticasone (FLONASE) 50 mcg/act nasal spray  Self Yes No   Si sprays into each nostril daily   loratadine (CLARITIN) 10 mg tablet  Self Yes No   Sig: Take 10 mg by mouth   losartan (COZAAR) 25 mg tablet  Self Yes No   Sig: Take 25 mg by mouth   metoprolol succinate (TOPROL-XL) 25 mg 24 hr tablet  Self Yes No   Sig: take 1/2 (one-half) tablet by mouth once daily   rosuvastatin (CRESTOR) 10 MG tablet  Self Yes No   Sig: Take 10 mg by mouth      Facility-Administered Medications: None     Discharge Medication List as of 2025  1:03 PM        CONTINUE these medications which have NOT CHANGED    Details   albuterol (Ventolin HFA) 90 mcg/act inhaler Inhale 2 puffs every 6 (six) hours as needed for wheezing, Starting Mon 2025, Normal      amLODIPine (NORVASC) 2.5 mg tablet Take 2.5 mg by mouth daily, Historical Med      Calcium Carbonate Antacid 400 MG CHEW Chew, Historical Med      Cholecalciferol 100 MCG (4000 UT) TABS Take 2 tablets by mouth daily, Starting Thu 2024, Historical Med      famotidine (PEPCID) 20 mg tablet Take 1 tablet (20 mg total) by mouth daily, Starting Wed 2025, Until Sun 2025, Normal      fluticasone  (FLONASE) 50 mcg/act nasal spray 2 sprays into each nostril daily, Historical Med      loratadine (CLARITIN) 10 mg tablet Take 10 mg by mouth, Starting Thu 5/16/2024, Historical Med      losartan (COZAAR) 25 mg tablet Take 25 mg by mouth, Starting Thu 5/16/2024, Historical Med      metoprolol succinate (TOPROL-XL) 25 mg 24 hr tablet take 1/2 (one-half) tablet by mouth once daily, Historical Med      rosuvastatin (CRESTOR) 10 MG tablet Take 10 mg by mouth, Starting Thu 5/16/2024, Historical Med           No discharge procedures on file.  ED SEPSIS DOCUMENTATION   Time reflects when diagnosis was documented in both MDM as applicable and the Disposition within this note       Time User Action Codes Description Comment    2/19/2025 12:59 PM Mary Davis [W19.XXXA] Fall, initial encounter     2/19/2025  1:01 PM Mary Davis [S00.81XA] Abrasion of face, initial encounter     2/19/2025  4:18 PM Mary Davis [R91.1] Pulmonary nodule                  OSBALDO Ng  02/19/25 1621

## 2025-02-21 ENCOUNTER — OFFICE VISIT (OUTPATIENT)
Dept: PAIN MEDICINE | Facility: CLINIC | Age: 85
End: 2025-02-21
Payer: MEDICARE

## 2025-02-21 VITALS — HEIGHT: 72 IN | BODY MASS INDEX: 25.38 KG/M2 | WEIGHT: 187.39 LBS

## 2025-02-21 DIAGNOSIS — M21.371 RIGHT FOOT DROP: ICD-10-CM

## 2025-02-21 DIAGNOSIS — M48.061 SPINAL STENOSIS OF LUMBAR REGION, UNSPECIFIED WHETHER NEUROGENIC CLAUDICATION PRESENT: Primary | ICD-10-CM

## 2025-02-21 DIAGNOSIS — M54.16 LUMBAR RADICULOPATHY: ICD-10-CM

## 2025-02-21 PROCEDURE — 99214 OFFICE O/P EST MOD 30 MIN: CPT | Performed by: STUDENT IN AN ORGANIZED HEALTH CARE EDUCATION/TRAINING PROGRAM

## 2025-02-21 RX ORDER — PREGABALIN 25 MG/1
25 CAPSULE ORAL 2 TIMES DAILY
Qty: 60 CAPSULE | Refills: 0 | Status: SHIPPED | OUTPATIENT
Start: 2025-02-21

## 2025-02-21 NOTE — PATIENT INSTRUCTIONS
Patient Education     Pregabalin (pre RODRIGO a khang)   Brand Names: US Lyrica; Lyrica CR   Brand Names: Ajith ACH-Pregabalin; AG-Pregabalin; APO-Pregabalin; Auro-Pregabalin; DOM-Pregabalin; JAMP-Pregabalin; Lyrica; M-Pregabalin; Mar-Pregabalin; MINT-Pregabalin; ANABELLE-Pregabalin; NRA-Pregabalin; PMS-Pregabalin; OPAL-Pregabalin; SANDOZ Pregabalin; TARO-Pregabalin; TEVA-Pregabalin   What is this drug used for?   It is used to help control certain kinds of seizures.  It is used to treat painful nerve diseases.  It is used to treat fibromyalgia.  It may be given to you for other reasons. Talk with the doctor.  What do I need to tell my doctor BEFORE I take this drug?   If you are allergic to this drug; any part of this drug; or any other drugs, foods, or substances. Tell your doctor about the allergy and what signs you had.  If you have kidney disease.  If you are breast-feeding. Do not breast-feed while you take this drug.  This is not a list of all drugs or health problems that interact with this drug.  Tell your doctor and pharmacist about all of your drugs (prescription or OTC, natural products, vitamins) and health problems. You must check to make sure that it is safe for you to take this drug with all of your drugs and health problems. Do not start, stop, or change the dose of any drug without checking with your doctor.  What are some things I need to know or do while I take this drug?   Tell all of your health care providers that you take this drug. This includes your doctors, nurses, pharmacists, and dentists.  Avoid driving and doing other tasks or actions that call for you to be alert or have clear eyesight until you see how this drug affects you.  If seizures are different or worse after starting this drug, talk with the doctor.  Do not stop taking this drug all of a sudden without calling your doctor. You may have a greater risk of side effects. If you need to stop this drug, you will want to slowly stop it as  ordered by your doctor.  Avoid drinking alcohol while taking this drug.  Talk with your doctor before you use marijuana, other forms of cannabis, or prescription or OTC drugs that may slow your actions.  A very bad reaction called angioedema has happened with this drug. Sometimes, this may be life-threatening. Signs may include swelling of the hands, face, lips, eyes, tongue, or throat; trouble breathing; trouble swallowing; or unusual hoarseness. Get medical help right away if you have any of these signs.  Severe breathing problems have happened with this drug in people taking certain other drugs (like opioid pain drugs). This has also happened in people who already have lung or breathing problems. The risk may also be greater in people who are older than 65. Sometimes, breathing problems have been deadly. If you have questions, talk with the doctor.  If you are 65 or older, use this drug with care. You could have more side effects.  Talk with your doctor if you plan to father a child. This drug made male animals less fertile and caused sperm changes. Birth defects also happened in the young of male animals treated with this drug. It is not known if these problems happen in humans.  Tell your doctor if you are pregnant or plan on getting pregnant. You will need to talk about the benefits and risks of using this drug while you are pregnant.  What are some side effects that I need to call my doctor about right away?   WARNING/CAUTION: Even though it may be rare, some people may have very bad and sometimes deadly side effects when taking a drug. Tell your doctor or get medical help right away if you have any of the following signs or symptoms that may be related to a very bad side effect:  Signs of an allergic reaction, like rash; hives; itching; red, swollen, blistered, or peeling skin with or without fever; wheezing; tightness in the chest or throat; trouble breathing, swallowing, or talking; unusual hoarseness; or  swelling of the mouth, face, lips, tongue, or throat.  Change in eyesight.  Muscle pain or weakness.  Change in balance.  Feeling confused.  Shakiness.  Trouble breathing, slow breathing, or shallow breathing.  Blue or gray color of the skin, lips, nail beds, fingers, or toes.  Memory problems or loss.  Shortness of breath, a big weight gain, or swelling in the arms or legs.  Fast or abnormal heartbeat.  Fever, chills, or sore throat.  Skin sores.  Any skin change.  Trouble speaking.  Trouble sleeping.  Trouble walking.  Feeling high (easy laughing and feeling good).  Twitching.  Get medical help right away if you feel very sleepy, very dizzy, or if you pass out. Caregivers or others need to get medical help right away if the patient does not respond, does not answer or react like normal, or will not wake up.  Like other drugs that may be used for seizures, this drug may rarely raise the risk of suicidal thoughts or actions. The risk may be higher in people who have had suicidal thoughts or actions in the past. Call the doctor right away about any new or worse signs like depression; feeling nervous, restless, or grouchy; panic attacks; or other changes in mood or behavior. Call the doctor right away if any suicidal thoughts or actions occur.  Low platelet counts have rarely happened with this drug. This may lead to a higher chance of bleeding. Call your doctor right away if you have any unexplained bruising or bleeding.  What are some other side effects of this drug?   All drugs may cause side effects. However, many people have no side effects or only have minor side effects. Call your doctor or get medical help if any of these side effects or any other side effects bother you or do not go away:  Feeling dizzy, sleepy, tired, or weak.  Weight gain.  Not able to focus.  Headache.  Dry mouth.  Constipation.  Increased appetite.  Upset stomach.  Joint pain.  Nose or throat irritation.  These are not all of the side  effects that may occur. If you have questions about side effects, call your doctor. Call your doctor for medical advice about side effects.  You may report side effects to your national health agency.  You may report side effects to the FDA at 1-400.680.5591. You may also report side effects at https://www.fda.gov/medwatch.  How is this drug best taken?   Use this drug as ordered by your doctor. Read all information given to you. Follow all instructions closely.  Extended-release tablets:   Take after the evening meal if taking once daily.  Swallow whole. Do not chew, break, or crush.  Keep taking this drug as you have been told by your doctor or other health care provider, even if you feel well.  Capsules and oral solution:   Take with or without food.  Keep taking this drug as you have been told by your doctor or other health care provider, even if you feel well.  Oral solution:   Measure liquid doses carefully. Use the measuring device that comes with this drug. If there is none, ask the pharmacist for a device to measure this drug.  What do I do if I miss a dose?   Extended-release tablets:   Take a missed dose just before bedtime after eating a snack or after the next day's morning meal.  If you miss taking the missed dose after the next day's morning meal, skip the missed dose and go back to your normal time.  Do not take 2 doses at the same time or extra doses.  Capsules and oral solution:   Take a missed dose as soon as you think about it.  If it is close to the time for your next dose, skip the missed dose and go back to your normal time.  Do not take 2 doses at the same time or extra doses.  How do I store and/or throw out this drug?   Store in the original container at room temperature.  Store in a dry place. Do not store in a bathroom.  Store this drug in a safe place where children cannot see or reach it, and where other people cannot get to it. A locked box or area may help keep this drug safe. Keep  all drugs away from pets.  Throw away unused or  drugs. Do not flush down a toilet or pour down a drain unless you are told to do so. Check with your pharmacist if you have questions about the best way to throw out drugs. There may be drug take-back programs in your area.  General drug facts   If your symptoms or health problems do not get better or if they become worse, call your doctor.  Do not share your drugs with others and do not take anyone else's drugs.  Some drugs may have another patient information leaflet. If you have any questions about this drug, please talk with your doctor, nurse, pharmacist, or other health care provider.  This drug comes with an extra patient fact sheet called a Medication Guide. Read it with care. Read it again each time this drug is refilled. If you have any questions about this drug, please talk with the doctor, pharmacist, or other health care provider.  If you think there has been an overdose, call your poison control center or get medical care right away. Be ready to tell or show what was taken, how much, and when it happened.  Consumer Information Use and Disclaimer   This generalized information is a limited summary of diagnosis, treatment, and/or medication information. It is not meant to be comprehensive and should be used as a tool to help the user understand and/or assess potential diagnostic and treatment options. It does NOT include all information about conditions, treatments, medications, side effects, or risks that may apply to a specific patient. It is not intended to be medical advice or a substitute for the medical advice, diagnosis, or treatment of a health care provider based on the health care provider's examination and assessment of a patient's specific and unique circumstances. Patients must speak with a health care provider for complete information about their health, medical questions, and treatment options, including any risks or benefits  regarding use of medications. This information does not endorse any treatments or medications as safe, effective, or approved for treating a specific patient. [employer] and its affiliates disclaim any warranty or liability relating to this information or the use thereof. The use of this information is governed by the Terms of Use, available at https://www.[employer].com/en/know/clinical-effectiveness-terms.  Last Reviewed Date   2023-12-21  Copyright   © 2024 [employer] and its affiliates and/or licensors. All rights reserved.

## 2025-02-26 ENCOUNTER — OFFICE VISIT (OUTPATIENT)
Age: 85
End: 2025-02-26
Payer: MEDICARE

## 2025-02-26 VITALS
WEIGHT: 187 LBS | OXYGEN SATURATION: 91 % | HEART RATE: 101 BPM | SYSTOLIC BLOOD PRESSURE: 132 MMHG | DIASTOLIC BLOOD PRESSURE: 62 MMHG | TEMPERATURE: 97.5 F | HEIGHT: 72 IN | BODY MASS INDEX: 25.33 KG/M2

## 2025-02-26 DIAGNOSIS — L82.1 SEBORRHEIC KERATOSIS: ICD-10-CM

## 2025-02-26 DIAGNOSIS — L81.4 LENTIGINES: ICD-10-CM

## 2025-02-26 DIAGNOSIS — C44.90 NON-MELANOMA SKIN CANCER: ICD-10-CM

## 2025-02-26 DIAGNOSIS — D18.01 CHERRY ANGIOMA: ICD-10-CM

## 2025-02-26 DIAGNOSIS — D22.9 MULTIPLE MELANOCYTIC NEVI: ICD-10-CM

## 2025-02-26 DIAGNOSIS — Z85.820 HISTORY OF MELANOMA: ICD-10-CM

## 2025-02-26 DIAGNOSIS — L57.0 KERATOSIS, ACTINIC: Primary | ICD-10-CM

## 2025-02-26 PROCEDURE — 17003 DESTRUCT PREMALG LES 2-14: CPT | Performed by: STUDENT IN AN ORGANIZED HEALTH CARE EDUCATION/TRAINING PROGRAM

## 2025-02-26 PROCEDURE — 99204 OFFICE O/P NEW MOD 45 MIN: CPT | Performed by: STUDENT IN AN ORGANIZED HEALTH CARE EDUCATION/TRAINING PROGRAM

## 2025-02-26 PROCEDURE — 17000 DESTRUCT PREMALG LESION: CPT | Performed by: STUDENT IN AN ORGANIZED HEALTH CARE EDUCATION/TRAINING PROGRAM

## 2025-02-26 NOTE — PROGRESS NOTES
Problem: Patient Care Overview  Goal: Plan of Care Review  Outcome: Ongoing (interventions implemented as appropriate)  Flowsheets (Taken 8/5/2020 0400)  Plan of Care Reviewed With: patient  Note:   Pt returned to ICU s/p cerebral angiogram. Still very groggy but arouses to voice, can answer questions & follow commands, but still somewhat confused. Femoral and radial sites clean, dry, intact. Pulses normal & palpable. VSS, SBP remains <160; no PRN's needed. 3L NC. Both daughters have been updated and were able to see pt post sx. Will continue to monitor.      Problem: Fall Risk (Adult)  Goal: Identify Related Risk Factors and Signs and Symptoms  Outcome: Ongoing (interventions implemented as appropriate)  Goal: Absence of Fall  Outcome: Ongoing (interventions implemented as appropriate)  Flowsheets (Taken 8/5/2020 0605)  Absence of Fall: achieves outcome     Problem: Skin Injury Risk (Adult)  Goal: Skin Health and Integrity  Outcome: Ongoing (interventions implemented as appropriate)  Flowsheets (Taken 8/5/2020 0605)  Skin Health and Integrity: making progress toward outcome      "[facility] Note     Patient Name: Dante Mckinney  Encounter Date: 2/26/25     Have you been cared for by a [facility] Dermatologist in the last 3 years and, if so, which description applies to you?    NO.   I am considered a \"new\" patient and must complete all patient intake questions. I am MALE/not capable of bearing children.    REVIEW OF SYSTEMS:  Have you recently had or currently have any of the following? Recent fever or chills? No  Any non-healing wound? No   PAST MEDICAL HISTORY:  Have you personally ever had or currently have any of the following?  If \"YES,\" then please provide more detail. Skin cancer (such as Melanoma, Basal Cell Carcinoma, Squamous Cell Carcinoma?  YES, melanoma in 2019, BCC and SCC  Tuberculosis, HIV/AIDS, Hepatitis B or C: No  Radiation Treatment No   HISTORY OF IMMUNOSUPPRESSION:   Do you have a history of any of the following:  Systemic Immunosuppression such as Diabetes, Biologic or Immunotherapy, Chemotherapy, Organ Transplantation, Bone Marrow Transplantation or Prednisone?  No     Answering \"YES\" requires the addition of the dotphrase \"IMMUNOSUPPRESSED\" as the first diagnosis of the patient's visit.   FAMILY HISTORY:  Any \"first degree relatives\" (parent, brother, sister, or child) with the following?    Skin Cancer, Pancreatic or Other Cancer? YES, mother-not sure, brother- colon, sister-breast and colon, Braca 1-gene   PATIENT EXPERIENCE:    Do you want the Dermatologist to perform a COMPLETE skin exam today including a clinical examination under the \"bra and underwear\" areas?  Yes  If necessary, do we have your permission to call and leave a detailed message on your Preferred Phone number that includes your specific medical information?  Yes      Allergies   Allergen Reactions   • Balance B-50 Dizziness   • Omeprazole Other (See Comments)   • Prednisone Other (See Comments)   • Simvastatin Myalgia      Current Outpatient Medications:   •  albuterol (Ventolin " HFA) 90 mcg/act inhaler, Inhale 2 puffs every 6 (six) hours as needed for wheezing, Disp: 18 g, Rfl: 5  •  amLODIPine (NORVASC) 2.5 mg tablet, Take 2.5 mg by mouth daily, Disp: , Rfl:   •  Calcium Carbonate Antacid 400 MG CHEW, Chew, Disp: , Rfl:   •  Cholecalciferol 100 MCG (4000 UT) TABS, Take 2 tablets by mouth daily, Disp: , Rfl:   •  famotidine (PEPCID) 20 mg tablet, Take 1 tablet (20 mg total) by mouth daily, Disp: 30 tablet, Rfl: 1  •  fluticasone (FLONASE) 50 mcg/act nasal spray, 2 sprays into each nostril daily, Disp: , Rfl:   •  loratadine (CLARITIN) 10 mg tablet, Take 10 mg by mouth, Disp: , Rfl:   •  losartan (COZAAR) 25 mg tablet, Take 25 mg by mouth, Disp: , Rfl:   •  metoprolol succinate (TOPROL-XL) 25 mg 24 hr tablet, take 1/2 (one-half) tablet by mouth once daily, Disp: , Rfl:   •  pregabalin (LYRICA) 25 mg capsule, Take 1 capsule (25 mg total) by mouth 2 (two) times a day Start by taking 1 pill at bedtime for 2 days. Then increase to 1 pill in the evening and pill at bedtime thereafter., Disp: 60 capsule, Rfl: 0  •  rosuvastatin (CRESTOR) 10 MG tablet, Take 10 mg by mouth, Disp: , Rfl:       New patient present for skin check, has hx of melanoma, no lymph nodes taken out, has had multiple BCC and SCCs taken out, pt has BRACA-1 Gene, had colon cancer and had lymph nodes taken out that were normal, also currently has prostate cancer not currently treating. SOC on face.    Whom besides the patient is providing clinical information about today's encounter?   NO ADDITIONAL HISTORIAN (patient alone provided history)    Physical Exam and Assessment/Plan by Diagnosis:      SEBORRHEIC KERATOSES  - Relevant exam: Scattered over the trunk/extremities are waxy brown to black plaques and papules with stuck on appearance and consistent dermoscopy  - Exam and clinical history consistent with seborrheic keratoses  - Counseled that these are benign growths that do not require treatment    MELANOCYTIC  NEVI  -Relevant exam: Scattered over the trunk/extremities are homogenously pigmented brown macules and papules. ELM performed and without concerning findings. No outliers unless otherwise noted in today's note  - Exam and clinical history consistent with melanocytic nevi  - Counseled to return to clinic prior to scheduled appointment should any of these lesions change or should any new lesions of concern arise  - Counseled on use of sun protection daily. Reviewed latest FDA sunscreen guidelines, including use of broad spectrum (UVA and UVB blocking) sunscreen or sun protective clothing with SPF 30-50 every 2-3 hours and reapplied after exposure to water    LENTIGINES  OTHER SKIN CHANGES DUE TO CHRONIC EXPOSURE TO NONIONIZING RADIATION  - Relevant exam: Over sun exposed areas are brown macules. ELM performed and without concerning findings.  - Exam and clinical history consistent with lentigines.  - Counseled to return to clinic prior to scheduled appointment should any of these lesions change or should any new lesions of concern arise.  - Recommended use of sunscreen as above and below.    CHERRY ANGIOMAS  - Relevant exam: Scattered over the trunk/extremities are red papules  - Exam and clinical history consistent with cherry angiomas  - Educated that these are benign        ACTINIC KERATOSES  - Relevant exam: On the  left chest, left cheek are scaly pink macules without palpable dermal component    - Exam and clinical history consistent with actinic keratoses  - Discussed that these lesions are considered premalignant with the potential to evolve into squamous cell carcinoma.   - Discussed treatment options, which may inclue liquid nitrogen destruction, topical immunotherapy including risks, benefits  - Patient counseled to return to the office in 4-6 weeks after completion of treatment for recheck if not resolved at which time retreatment or biopsy to rule out SCC will be determined based on clinic  findings  -follow up in 6 months        OPTION 3:    PROCEDURE:  DESTRUCTION OF PRE-MALIGNANT LESIONS    - After a thorough discussion of treatment options and risk/benefits/alternatives (including but not limited to local pain, scarring, dyspigmentation, blistering, and possible superinfection), verbal and written consent were obtained and the aforementioned lesions were treated on with cryotherapy using liquid nitrogen x 3 cycle for 5-10 seconds.    TOTAL NUMBER of 4 pre-malignant lesions were treated today on the ANATOMIC LOCATION: left chest, left cheek.     The patient tolerated the procedure well, and after-care instructions were provided.      Scribe Attestation    I,:  Eileen Hart am acting as a scribe while in the presence of the attending physician.:       I,:  Pedro Liriano DO personally performed the services described in this documentation    as scribed in my presence.:

## 2025-03-03 ENCOUNTER — OFFICE VISIT (OUTPATIENT)
Dept: OBGYN CLINIC | Facility: CLINIC | Age: 85
End: 2025-03-03
Payer: MEDICARE

## 2025-03-03 VITALS — WEIGHT: 185.8 LBS | BODY MASS INDEX: 25.17 KG/M2 | HEIGHT: 72 IN

## 2025-03-03 DIAGNOSIS — M17.11 PRIMARY OSTEOARTHRITIS OF RIGHT KNEE: ICD-10-CM

## 2025-03-03 PROCEDURE — 99213 OFFICE O/P EST LOW 20 MIN: CPT | Performed by: ORTHOPAEDIC SURGERY

## 2025-03-03 NOTE — PROGRESS NOTES
Name: Dante Mckinney      : 1940      MRN: [id number]  Encounter Provider: Chris Inman DO  Encounter Date: 3/3/2025   Encounter department: [facility] [place]  :  Assessment & Plan  Primary osteoarthritis of right knee    Orders:    Ambulatory Referral to Orthopedic Surgery    Medial  Knee Brace    Ambulatory Referral to Physical Therapy; Future          Right knee osteoarthritis  X-rays of right knee reviewed and discussed   In depth conversation had with patient regarding nonoperative and surgical management  Continued nonoperative treatment by means of OTC topical and oral analgesics, activity modification, outpatient physical therapy, injection therapy, and bracing.   Due to failure of nonoperative treatment thus far, we discussed surgical management by means of right total knee arthroplasty.  He would like to continue nonoperative management at this time  Patient would require pulmonology, PCP, EKG, CXR, labs prior to surgery  Medial  brace ordered today  Follow up as needed.  If symptoms persist despite  bracing formal physical therapy he will consider total knee arthroplasty.    Chief Complaint     Right knee pain    History of the Present Illness   History of Present Illness   HPI   Dante Mckinney is a 84 y.o. male with Right knee pain secondary to osteoarthritis. The patient was previously seen by Dr. Beavers who ordered x-rays, provided Toradol injections, and referred the patient to me. He has previously tried corticosteroid and visco injections in [place]. He admits minimal relief from previous injections. Patient would like to discuss total knee arthroplasty. He ambulates with a cane. Lives at home with wife and daughter. He wears a hinge knee brace. Pain 5/10 today. Pain managed with tylenol. Patient has a history of lumbar spinal stenosis, treating with spine and pain management with injections. History of right foot drop. History of  left total knee arthroplasty in 2009.     Review of Systems     Review of Systems   Constitutional:  Negative for chills and fever.   HENT:  Negative for ear pain and sore throat.    Eyes:  Negative for pain and visual disturbance.   Respiratory:  Negative for cough and shortness of breath.    Cardiovascular:  Negative for chest pain and palpitations.   Gastrointestinal:  Negative for abdominal pain and vomiting.   Genitourinary:  Negative for dysuria and hematuria.   Musculoskeletal:  Negative for arthralgias and back pain.   Skin:  Negative for color change and rash.   Neurological:  Negative for seizures and syncope.   All other systems reviewed and are negative.      Physical Exam   Objective   Ht 6' (1.829 m)   Wt 84.3 kg (185 lb 12.8 oz)   BMI 25.20 kg/m²        Right Knee  Range of motion from 10 to 110.    There is mild crepitus with range of motion.   There is no effusion.    There is mild tenderness over the medial joint line.    There is 5/5 quadriceps strength  Weakness with dorsiflexion.    The patient is able to perform a straight leg raise.    Varus stress testing reveals no instability at 0 and 30 degrees   Valgus stress testing reveals no instability at 0 and 30 degrees  The patient is neurovascular intact distally.      Eyes:  Anicteric sclerae.  Neck:  Supple.  Lungs:  Normal respiratory effort.  Cardiovascular:  Capillary refill is less than 2 seconds.  Skin:  Intact without erythema.  Neurologic:  Sensation grossly intact to light touch.  Psychiatric:  Mood and affect are appropriate.    Data Review     I have personally reviewed pertinent films in PACS, and my interpretation follows:    X-rays taken 10/08/2024 of Right knee independently reviewed and demonstrate severe medial tibiofemoral compartment joint space narrowing. No acute fracture or dislocation.     Past Medical History:   Diagnosis Date    Cancer (HCC)     Colorectal cancer (HCC)     Macular degeneration     both eyes    Melanoma  in situ of cheek (HCC)     Osteoarthritis 2014       Past Surgical History:   Procedure Laterality Date    CARDIAC CATHETERIZATION Left 1/14/2025    Procedure: Cardiac Left Heart Cath;  Surgeon: Tin Lara MD;  Location: [facility];  Service: Cardiology    CARDIAC CATHETERIZATION N/A 1/14/2025    Procedure: Cardiac Coronary Angiogram;  Surgeon: Tin Lara MD;  Location: [facility];  Service: Cardiology    CARDIAC CATHETERIZATION  1/14/2025    Procedure: Left Heart Catherization;  Surgeon: Tin Lara MD;  Location: [facility];  Service: Cardiology    CARDIAC CATHETERIZATION N/A 1/14/2025    Procedure: Cardiac FFR/IFR;  Surgeon: Tin Lara MD;  Location: [facility];  Service: Cardiology    CATARACT EXTRACTION Bilateral     2015    COLONOSCOPY      JOINT REPLACEMENT  2009    LAPAROSCOPIC COLON RESECTION      PROSTATE BIOPSY      2009 and 1994    REPLACEMENT TOTAL KNEE Left     2009    REPLACEMENT TOTAL KNEE Right     2016    TOTAL HIP ARTHROPLASTY      2021       Allergies   Allergen Reactions    Balance B-50 Dizziness    Omeprazole Other (See Comments)    Prednisone Other (See Comments)    Simvastatin Myalgia       Current Outpatient Medications on File Prior to Visit   Medication Sig Dispense Refill    albuterol (Ventolin HFA) 90 mcg/act inhaler Inhale 2 puffs every 6 (six) hours as needed for wheezing 18 g 5    amLODIPine (NORVASC) 2.5 mg tablet Take 2.5 mg by mouth daily      Calcium Carbonate Antacid 400 MG CHEW Chew      Cholecalciferol 100 MCG (4000 UT) TABS Take 2 tablets by mouth daily      famotidine (PEPCID) 20 mg tablet Take 1 tablet (20 mg total) by mouth daily 30 tablet 1    fluticasone (FLONASE) 50 mcg/act nasal spray 2 sprays into each nostril daily      loratadine (CLARITIN) 10 mg tablet Take 10 mg by mouth      losartan (COZAAR) 25 mg tablet Take 25 mg by mouth      metoprolol succinate (TOPROL-XL) 25 mg 24 hr tablet take 1/2 (one-half) tablet by mouth  once daily      pregabalin (LYRICA) 25 mg capsule Take 1 capsule (25 mg total) by mouth 2 (two) times a day Start by taking 1 pill at bedtime for 2 days. Then increase to 1 pill in the evening and pill at bedtime thereafter. 60 capsule 0    rosuvastatin (CRESTOR) 10 MG tablet Take 10 mg by mouth       No current facility-administered medications on file prior to visit.       Social History     Tobacco Use    Smoking status: Former     Current packs/day: 0.00     Average packs/day: 0.5 packs/day for 30.7 years (15.4 ttl pk-yrs)     Types: Cigarettes     Start date: 1964     Quit date: 10/8/1994     Years since quittin.4     Passive exposure: Never    Smokeless tobacco: Never   Vaping Use    Vaping status: Never Used   Substance Use Topics    Alcohol use: Yes     Alcohol/week: 40.0 standard drinks of alcohol     Types: 40 Cans of beer per week    Drug use: Never       Family History   Problem Relation Age of Onset    Cancer Mother     Heart disease Father     Breast cancer Sister     Cancer Sister         Breast & Colon    Colorectal Cancer Maternal Grandmother     Hodgkin's lymphoma Brother     Cancer Brother              Procedures Performed     Procedures  None.      Nahed Sanders  Scribe Attestation      I,:  Nahed Sanders am acting as a scribe while in the presence of the attending physician.:       I,:  Chris Inman,  personally performed the services described in this documentation    as scribed in my presence.:

## 2025-03-03 NOTE — ASSESSMENT & PLAN NOTE
Orders:    Ambulatory Referral to Orthopedic Surgery    Medial  Knee Brace    Ambulatory Referral to Physical Therapy; Future

## 2025-03-07 ENCOUNTER — EVALUATION (OUTPATIENT)
Dept: PHYSICAL THERAPY | Facility: CLINIC | Age: 85
End: 2025-03-07
Payer: MEDICARE

## 2025-03-07 ENCOUNTER — TELEPHONE (OUTPATIENT)
Age: 85
End: 2025-03-07

## 2025-03-07 DIAGNOSIS — M48.061 SPINAL STENOSIS OF LUMBAR REGION, UNSPECIFIED WHETHER NEUROGENIC CLAUDICATION PRESENT: ICD-10-CM

## 2025-03-07 DIAGNOSIS — M54.16 LUMBAR RADICULOPATHY: ICD-10-CM

## 2025-03-07 DIAGNOSIS — M17.11 PRIMARY OSTEOARTHRITIS OF RIGHT KNEE: ICD-10-CM

## 2025-03-07 PROCEDURE — 97161 PT EVAL LOW COMPLEX 20 MIN: CPT | Performed by: PHYSICAL THERAPIST

## 2025-03-07 PROCEDURE — 97110 THERAPEUTIC EXERCISES: CPT | Performed by: PHYSICAL THERAPIST

## 2025-03-07 RX ORDER — PREGABALIN 25 MG/1
25 CAPSULE ORAL 3 TIMES DAILY
Qty: 90 CAPSULE | Refills: 1 | Status: SHIPPED | OUTPATIENT
Start: 2025-03-07 | End: 2025-03-11

## 2025-03-07 NOTE — TELEPHONE ENCOUNTER
Caller: Patient    Doctor: Dr. Simon    Reason for call: patient calling to discuss his pain and the medication as instructed by Dr Simon    Call back#: [phone]

## 2025-03-07 NOTE — TELEPHONE ENCOUNTER
Caller: Pharmacy    Doctor: Dr. Simon    Reason for call: Pharmacy called to get DOS for patients last visit.     Call back#: NA

## 2025-03-07 NOTE — PROGRESS NOTES
PT Evaluation     Today's date: 3/7/2025  Patient name: Dante Mckinney  : 1940  MRN: [id number]  Referring provider: Chris Inman DO  Dx:   Encounter Diagnosis     ICD-10-CM    1. Primary osteoarthritis of right knee  M17.11 Ambulatory Referral to Physical Therapy                     Assessment  Impairments: abnormal gait, abnormal or restricted ROM, activity intolerance, impaired physical strength and pain with function    Assessment details: Pt is a 84 y.o. male who presents to physical therapy with primary nociceptive pain associated with R knee OA in the environment of R foot drop, lumbar spinal stenosis, and R lumbar radiculopathy complicated by age. Pt does not present with any red flag symptoms at this time. Pt presents with reduced R knee A/PROM, slightly reduced R knee strength, and abnormal gait. No pain reproduction with ROM testing. Low level of pain with Wbing today. Clinicians prior experience is that this patient profile does well with  brace as his symptoms have low irritability and only during Wbing. Discussed this with patient. Will wait until he receives this brace to determine presentation and role of skilled PT. Pt would benefit from skilled physical therapy in order to decrease deficits and return to prior level of function.    Understanding of Dx/Px/POC: good    Goals  STG (4 weeks):  Pt will be independent with HEP.  Pt will demonstrate increase in flexion ROM by 5-10d.  Pt will demonstrate increase in extension ROM by 3d.  Pt will demonstrate increase in MMT grade by 1/3 for hip abd.    LTG (8 weeks):  FOTO will be expected outcome.   Pt will demonstrate flexion ROM comparable to contralateral limb.  Pt will demonstrate MMT grade comparable to contralateral limb in all deficient muscle groups.      Plan  Patient would benefit from: skilled physical therapy  Planned modality interventions: cryotherapy    Planned therapy interventions: manual therapy, neuromuscular  "re-education, patient education, self care, strengthening, stretching, therapeutic activities, therapeutic exercise and home exercise program    Frequency: 1-2x/week.  Duration in weeks: 8  Treatment plan discussed with: patient    Subjective Evaluation    History of Present Illness  Mechanism of injury: Chief Complaint: Pt reports that he has had R knee pain >5 years. He reports that it has been stable for the past 5 years without progression. He continues to have pain only with Wbing activity. He is able to walk with a SPC without much difficulty. However, prolonged walking he will require a walker. Was given an injection 3 weeks ago with slight decrease in pain. However, also will be getting fit for an  brace.     Long standing history of low back pain and R sided sciatica. He states that this pain comes and goes. However, he did have R sided back pain increasing recently. Received injection into the spine ~1 month ago, this resolved it. He states that he has a R drop foot from a previous lumbar injection years ago. Just recently acquired an AFO. Feels this is very comfortable.     Severity: low  Irritability: low  Nature: nociceptive  Stage: chronic  Stability: stable    P1: see body chart  Patient Goals  Patient goal: \"unsure anything can be done for this\"    Objective     Observations     Additional Observation Details  Atrophy of R LE through lower leg. Slight reduction in R quad and hamstring as well  Joint line hypertrophy    Active Range of Motion   Left Knee   Flexion: 123 degrees   Extensor la degrees     Right Knee   Flexion: 115 degrees   Extension: 12 degrees     Additional Active Range of Motion Details  No recreation of knee pain     Passive Range of Motion   Left Knee   Flexion: 125 degrees   Extension: 5 degrees     Right Knee   Flexion: 118 degrees   Extension: 8 degrees     Additional Passive Range of Motion Details  No recreation of R knee pain with any PROM OP into flexion or " extension    Strength/Myotome Testing     Left Knee   Flexion: 5  Extension: 5    Right Knee   Flexion: 4+  Extension: 5    General Comments:      Knee Comments  Gait: 3/10 pain with walking, heavy use of SPC on the contralateral side  Stairs: Able to achieve reciprocal but heavy use of arms, pt reports feeling much safer with step to pattern           Precautions: prostate cancer, lumbar stenosis, R drop foot with AFO      Manuals 3/7                                                                Neuro Re-Ed                                                                                                        Ther Ex             Pt edu Function of brace, presentation                                                                                                       Ther Activity                                       Gait Training                                       Modalities

## 2025-03-07 NOTE — TELEPHONE ENCOUNTER
S/w pt he has been taking the Lyrica 25 mg 1 in the evening and 1 at HS for 2 weeks and it helps somewhat, pt would like to try increasing further due to no side effects. Pt [facility] 25 [facility] TID. Pt will call back with any issues.

## 2025-03-11 ENCOUNTER — TELEPHONE (OUTPATIENT)
Age: 85
End: 2025-03-11

## 2025-03-11 DIAGNOSIS — M54.16 LUMBAR RADICULOPATHY: ICD-10-CM

## 2025-03-11 DIAGNOSIS — M48.061 SPINAL STENOSIS OF LUMBAR REGION, UNSPECIFIED WHETHER NEUROGENIC CLAUDICATION PRESENT: ICD-10-CM

## 2025-03-11 RX ORDER — PREGABALIN 25 MG/1
25 CAPSULE ORAL 3 TIMES DAILY
Qty: 270 CAPSULE | Refills: 0 | Status: SHIPPED | OUTPATIENT
Start: 2025-03-11 | End: 2025-03-11

## 2025-03-11 RX ORDER — PREGABALIN 25 MG/1
25 CAPSULE ORAL 3 TIMES DAILY
Qty: 270 CAPSULE | Refills: 0 | Status: SHIPPED | OUTPATIENT
Start: 2025-03-11 | End: 2025-03-13

## 2025-03-11 NOTE — TELEPHONE ENCOUNTER
S/W [facility]   Their fax number has changed to [phone]  Called [employer] and put in a ticket to change it  Pt does not need Rx until next month  Will await to make sure Fax number is changed and have SP resend

## 2025-03-11 NOTE — TELEPHONE ENCOUNTER
S/W pt  2 month supply sent to [facility], but pt needs 90 day supply sent to the [facility] on [facility]  Pt states he is picking up the Rx at [facility] for this month, but states Rx's take a month to get when ordered through [facility]    Can 90 day be sent starting 4/10?  Advised pt he would need another OV, can this be made for July for f/u?

## 2025-03-11 NOTE — TELEPHONE ENCOUNTER
Caller: Patient    Doctor: Dr. Simon    Reason for call: Patient would [facility], Patient gets a 3 month supply  pregabalin (LYRICA) 25 mg     [facility]# [facility] [phone]  ATT DR Franco   Call back#:

## 2025-03-13 DIAGNOSIS — M54.16 LUMBAR RADICULOPATHY: ICD-10-CM

## 2025-03-13 DIAGNOSIS — M48.061 SPINAL STENOSIS OF LUMBAR REGION, UNSPECIFIED WHETHER NEUROGENIC CLAUDICATION PRESENT: ICD-10-CM

## 2025-03-13 RX ORDER — PREGABALIN 25 MG/1
25 CAPSULE ORAL 3 TIMES DAILY
Qty: 270 CAPSULE | Refills: 0 | Status: SHIPPED | OUTPATIENT
Start: 2025-04-10 | End: 2025-04-02

## 2025-03-13 NOTE — TELEPHONE ENCOUNTER
Can Rx for 90 day Lyrica be sent to [facility]?  (Fax Number should be correct)  Start 4/10

## 2025-03-25 ENCOUNTER — HOSPITAL ENCOUNTER (OUTPATIENT)
Dept: PULMONOLOGY | Facility: HOSPITAL | Age: 85
Discharge: HOME | End: 2025-03-25
Payer: MEDICARE

## 2025-03-25 DIAGNOSIS — J44.9 CHRONIC OBSTRUCTIVE PULMONARY DISEASE, UNSPECIFIED COPD TYPE (HCC): ICD-10-CM

## 2025-03-25 PROCEDURE — 94060 EVALUATION OF WHEEZING: CPT

## 2025-03-25 PROCEDURE — 94729 DIFFUSING CAPACITY: CPT | Performed by: INTERNAL MEDICINE

## 2025-03-25 PROCEDURE — 94726 PLETHYSMOGRAPHY LUNG VOLUMES: CPT

## 2025-03-25 PROCEDURE — 94726 PLETHYSMOGRAPHY LUNG VOLUMES: CPT | Performed by: INTERNAL MEDICINE

## 2025-03-25 PROCEDURE — 94760 N-INVAS EAR/PLS OXIMETRY 1: CPT

## 2025-03-25 PROCEDURE — 94060 EVALUATION OF WHEEZING: CPT | Performed by: INTERNAL MEDICINE

## 2025-03-25 PROCEDURE — 94729 DIFFUSING CAPACITY: CPT

## 2025-03-25 RX ORDER — ALBUTEROL SULFATE 0.83 MG/ML
2.5 SOLUTION RESPIRATORY (INHALATION) ONCE
Status: COMPLETED | OUTPATIENT
Start: 2025-03-25 | End: 2025-03-25

## 2025-03-25 RX ADMIN — ALBUTEROL SULFATE 2.5 MG: 2.5 SOLUTION RESPIRATORY (INHALATION) at 11:43

## 2025-03-28 ENCOUNTER — RESULTS FOLLOW-UP (OUTPATIENT)
Age: 85
End: 2025-03-28

## 2025-03-31 ENCOUNTER — OFFICE VISIT (OUTPATIENT)
Age: 85
End: 2025-03-31
Payer: MEDICARE

## 2025-03-31 VITALS
BODY MASS INDEX: 25.98 KG/M2 | OXYGEN SATURATION: 95 % | SYSTOLIC BLOOD PRESSURE: 110 MMHG | HEIGHT: 72 IN | WEIGHT: 191.8 LBS | DIASTOLIC BLOOD PRESSURE: 60 MMHG | TEMPERATURE: 98.2 F | HEART RATE: 77 BPM

## 2025-03-31 DIAGNOSIS — R93.89 ABNORMAL CXR: Primary | ICD-10-CM

## 2025-03-31 DIAGNOSIS — R06.02 SOB (SHORTNESS OF BREATH) ON EXERTION: ICD-10-CM

## 2025-03-31 DIAGNOSIS — J44.9 CHRONIC OBSTRUCTIVE PULMONARY DISEASE, UNSPECIFIED COPD TYPE (HCC): ICD-10-CM

## 2025-03-31 DIAGNOSIS — G47.33 OSA (OBSTRUCTIVE SLEEP APNEA): ICD-10-CM

## 2025-03-31 PROCEDURE — 99204 OFFICE O/P NEW MOD 45 MIN: CPT | Performed by: INTERNAL MEDICINE

## 2025-03-31 NOTE — PROGRESS NOTES
Consultation - Pulmonary Medicine   Name: Dante Mckinney      : 1940      MRN: [id number]  Encounter Provider: Patsy Long MD  Encounter Date: 3/31/2025   Encounter department: [facility] [place]    Requesting Provider:   Severino Hilton Md  [address] [place] [address] [place] Fu [id number]     Reason for Consult: Abnormal chest x-ray:  Assessment & Plan  Abnormal CXR  Chest x-ray 2025 with increased interstitial markings bilaterally but right lower lobe with significantly decreased markings and likely bili/loculated pneumothorax  Will get a CT of the chest and follow-up there is no prior chest x-ray reports also to compare  Orders:    CT chest without contrast; Future    Chronic obstructive pulmonary disease, unspecified COPD type (HCC)  Spirometry with distant abscess increased lung volumes and air trapping and normal DLCO flow-volume curve with obstructive pattern  He does have albuterol MDI to be used 2 puffs 4 times daily as needed  Currently discussed no need for long-acting bronchodilators    Orders:    Ambulatory Referral to Pulmonology    SOB (shortness of breath) on exertion  Likely multifactorial secondary to his air-trapping and significant bullae in the chest x-ray but we need to confirm that with a CT of the chest       AURE (obstructive sleep apnea)  Prior history of obstructive sleep apnea on CPAP at 15 cm reviewed CPAP download compliance for the past 30 days with 93.3% compliance no evidence of any mask leak acceptable residual AHI of 2.9  He wants to know if he can come out of the CPAP and go for the inspire  No prior studies are available  Discussed going forward and diagnostic sleep study  Also discussed that he could not cannot get a home sleep study because he is currently on home oxygen at nighttime  Etiology pathogenesis of obstructive sleep apnea discussed in detail  Consequences of untreated sleep apnea discussed with excessive daytime  sleepiness, increased risk for myocardial infarction stroke atrial fibrillation discussed  Will order for a diagnostic sleep study and follow-up will follow-up after the testing and refer for inspire as able    Orders:    Diagnostic Sleep Study; Future      No follow-ups on file.  History of Present Illness   Dante Mckinney is a 84 y.o. 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  She had a chest x-ray done in February with a peak bili/loculated pneumothorax on the right lower lobe no prior chest x-rays available for comparison  He also has history of obstructive sleep apnea on CPAP at 15 cm of water also uses 1.5 L of oxygen through the CPAP  Review of Systems   Constitutional: Negative.  Negative for appetite change and fever.   HENT: Negative.  Negative for ear pain, postnasal drip, rhinorrhea, sneezing, sore throat and trouble swallowing.    Eyes: Negative.    Respiratory:  Positive for cough and shortness of breath.    Cardiovascular: Negative.  Negative for chest pain.   Gastrointestinal: Negative.    Endocrine: Negative.    Genitourinary: Negative.    Musculoskeletal: Negative.  Negative for myalgias.   Allergic/Immunologic: Negative.    Neurological: Negative.  Negative for headaches.   Psychiatric/Behavioral: Negative.         Aside from what is mentioned in the HPI, ROS is otherwise negative    Medical History Reviewed by provider this encounter:     .    Historical Information   Past Medical History:   Diagnosis Date    Cancer (HCC)     Colorectal cancer (HCC)      Macular degeneration     both eyes    Melanoma in situ of cheek (HCC)     Osteoarthritis 2014    Sleep apnea, obstructive      Past Surgical History:   Procedure Laterality Date    CARDIAC CATHETERIZATION Left 2025    Procedure: Cardiac Left Heart Cath;  Surgeon: Tin Lara MD;  Location: [facility];  Service: Cardiology    CARDIAC CATHETERIZATION N/A 2025    Procedure: Cardiac Coronary Angiogram;  Surgeon: Tin Lara MD;  Location: [facility];  Service: Cardiology    CARDIAC CATHETERIZATION  2025    Procedure: Left Heart Catherization;  Surgeon: Tin Lara MD;  Location: [facility];  Service: Cardiology    CARDIAC CATHETERIZATION N/A 2025    Procedure: Cardiac FFR/IFR;  Surgeon: Tin Lara MD;  Location: [facility];  Service: Cardiology    CATARACT EXTRACTION Bilateral     2015    COLONOSCOPY      JOINT REPLACEMENT  2009    LAPAROSCOPIC COLON RESECTION      PROSTATE BIOPSY       and     REPLACEMENT TOTAL KNEE Left     2009    REPLACEMENT TOTAL KNEE Right         TOTAL HIP ARTHROPLASTY           Social History   Social History     Tobacco Use   Smoking Status Former    Current packs/day: 0.00    Average packs/day: 0.5 packs/day for 30.7 years (15.4 ttl pk-yrs)    Types: Cigarettes    Start date: 1964    Quit date: 10/8/1994    Years since quittin.4    Passive exposure: Never   Smokeless Tobacco Never       Occupational/Environmental history:  Sheet-metal work currently retired    Family History:   Family History   Problem Relation Age of Onset    Cancer Mother     Heart disease Father     Breast cancer Sister     Cancer Sister         Breast & Colon    Colorectal Cancer Maternal Grandmother     Hodgkin's lymphoma Brother     Cancer Brother        Objective   /60   Pulse 77   Temp 98.2 °F (36.8 °C)   Ht 6' (1.829 m)   Wt 87 kg (191 lb 12.8 oz)   SpO2 95%   BMI 26.01 kg/m²      Physical Exam  Vitals and nursing  note reviewed.   Constitutional:       Appearance: He is well-developed.   HENT:      Head: Normocephalic and atraumatic.   Eyes:      Conjunctiva/sclera: Conjunctivae normal.      Pupils: Pupils are equal, round, and reactive to light.   Neck:      Thyroid: No thyromegaly.      Vascular: No JVD.   Cardiovascular:      Rate and Rhythm: Normal rate and regular rhythm.      Heart sounds: Normal heart sounds. No murmur heard.     No friction rub. No gallop.   Pulmonary:      Effort: Pulmonary effort is normal. No respiratory distress.      Breath sounds: Normal breath sounds. No wheezing or rales.   Chest:      Chest wall: No tenderness.   Abdominal:      General: Bowel sounds are normal.      Palpations: Abdomen is soft.   Musculoskeletal:         General: No tenderness or deformity. Normal range of motion.      Cervical back: Normal range of motion and neck supple.   Lymphadenopathy:      Cervical: No cervical adenopathy.   Skin:     General: Skin is warm and dry.   Neurological:      Mental Status: He is alert and oriented to person, place, and time.           Diagnostic Data:  Labs: I personally reviewed the most recent laboratory data pertinent to today's visit.      Radiology results:  Radiology Results Review: I personally reviewed the following image studies in PACS and associated radiology reports: chest xray. My interpretation of the radiology images/reports is: Chest x-ray 2/19/2025.      PFT/spirometry results: PFT on 3/25/2025 spirometry with this synopsis, no postbronchodilator response air trapping and hyperinflation normal corrected diffusion capacity flow-volume consistent with obstruction    Sitting and reading: Would never doze  Watching TV: Slight chance of dozing  Sitting, inactive in a public place (e.g. a theatre or a meeting): Would never doze  As a passenger in a car for an hour without a break: Would never doze  Lying down to rest in the afternoon when circumstances permit: Moderate chance of  dozing  Sitting and talking to someone: Would never doze  Sitting quietly after a lunch without alcohol: Would never doze  In a car, while stopped for a few minutes in traffic: Would never doze  Total score: 3   Patsy Long MD      Answers submitted by the patient for this visit:  Pulmonology Questionnaire (Submitted on 3/26/2025)  Chief Complaint: Primary symptoms  Do you experience frequent throat clearing?: Yes  Chronicity: recurrent  When did you first notice your symptoms?: more than 1 month ago  How often do your symptoms occur?: intermittently  Since you first noticed this problem, how has it changed?: gradually improving  Do you have shortness of breath that occurs with effort or exertion?: Yes  Do you have ear congestion?: Yes  Do you have heartburn?: No  Do you have fatigue?: No  Do you have nasal congestion?: Yes  Do you have shortness of breath when lying flat?: No  Do you have shortness of breath when you wake up?: No  Do you have sweats?: No  Have you experienced weight loss?: No  Which of the following makes your symptoms worse?: climbing stairs  Which of the following makes your symptoms better?: OTC cough suppressant

## 2025-03-31 NOTE — ASSESSMENT & PLAN NOTE
Spirometry with distant abscess increased lung volumes and air trapping and normal DLCO flow-volume curve with obstructive pattern  He does have albuterol MDI to be used 2 puffs 4 times daily as needed  Currently discussed no need for long-acting bronchodilators    Orders:    Ambulatory Referral to Pulmonology

## 2025-04-02 DIAGNOSIS — M54.16 LUMBAR RADICULOPATHY: ICD-10-CM

## 2025-04-02 DIAGNOSIS — M48.061 SPINAL STENOSIS OF LUMBAR REGION, UNSPECIFIED WHETHER NEUROGENIC CLAUDICATION PRESENT: ICD-10-CM

## 2025-04-02 RX ORDER — PREGABALIN 50 MG/1
50 CAPSULE ORAL 3 TIMES DAILY
Qty: 90 CAPSULE | Refills: 0 | Status: SHIPPED | OUTPATIENT
Start: 2025-04-10 | End: 2025-04-10

## 2025-04-04 ENCOUNTER — HOSPITAL ENCOUNTER (OUTPATIENT)
Dept: CT IMAGING | Facility: CLINIC | Age: 85
Discharge: HOME | End: 2025-04-04
Payer: MEDICARE

## 2025-04-04 DIAGNOSIS — R93.89 ABNORMAL CXR: ICD-10-CM

## 2025-04-04 PROCEDURE — 71250 CT THORAX DX C-: CPT

## 2025-04-07 ENCOUNTER — OFFICE VISIT (OUTPATIENT)
Age: 85
End: 2025-04-07
Payer: MEDICARE

## 2025-04-07 VITALS
SYSTOLIC BLOOD PRESSURE: 128 MMHG | HEIGHT: 72 IN | HEART RATE: 90 BPM | RESPIRATION RATE: 18 BRPM | BODY MASS INDEX: 26.22 KG/M2 | DIASTOLIC BLOOD PRESSURE: 66 MMHG | OXYGEN SATURATION: 95 % | TEMPERATURE: 97.8 F | WEIGHT: 193.6 LBS

## 2025-04-07 DIAGNOSIS — K21.9 GASTROESOPHAGEAL REFLUX DISEASE WITHOUT ESOPHAGITIS: ICD-10-CM

## 2025-04-07 DIAGNOSIS — G47.33 OBSTRUCTIVE SLEEP APNEA SYNDROME: ICD-10-CM

## 2025-04-07 DIAGNOSIS — R05.8 UPPER AIRWAY COUGH SYNDROME: ICD-10-CM

## 2025-04-07 DIAGNOSIS — I10 PRIMARY HYPERTENSION: ICD-10-CM

## 2025-04-07 DIAGNOSIS — E78.2 MIXED HYPERLIPIDEMIA: ICD-10-CM

## 2025-04-07 DIAGNOSIS — Z00.00 MEDICARE ANNUAL WELLNESS VISIT, SUBSEQUENT: Primary | ICD-10-CM

## 2025-04-07 DIAGNOSIS — R73.9 HYPERGLYCEMIA: ICD-10-CM

## 2025-04-07 DIAGNOSIS — I25.10 ATHEROSCLEROSIS OF NATIVE CORONARY ARTERY OF NATIVE HEART WITHOUT ANGINA PECTORIS: ICD-10-CM

## 2025-04-07 DIAGNOSIS — J44.9 CHRONIC OBSTRUCTIVE PULMONARY DISEASE, UNSPECIFIED COPD TYPE (HCC): ICD-10-CM

## 2025-04-07 PROCEDURE — G0439 PPPS, SUBSEQ VISIT: HCPCS | Performed by: STUDENT IN AN ORGANIZED HEALTH CARE EDUCATION/TRAINING PROGRAM

## 2025-04-07 RX ORDER — BENZONATATE 100 MG/1
100 CAPSULE ORAL 3 TIMES DAILY PRN
Qty: 20 CAPSULE | Refills: 3 | Status: SHIPPED | OUTPATIENT
Start: 2025-04-07

## 2025-04-07 NOTE — ASSESSMENT & PLAN NOTE
On CPAP. Is going for repeat sleep study with Pulmonology to determine needs for CPAP moving forward.

## 2025-04-07 NOTE — PATIENT INSTRUCTIONS
Medicare Preventive Visit Patient Instructions  Thank you for completing your Welcome to Medicare Visit or Medicare Annual Wellness Visit today. Your next wellness visit will be due in one year (4/8/2026).  The screening/preventive services that you may require over the next 5-10 years are detailed below. Some tests may not apply to you based off risk factors and/or age. Screening tests ordered at today's visit but not completed yet may show as past due. Also, please note that scanned in results may not display below.  Preventive Screenings:  Service Recommendations Previous Testing/Comments   Colorectal Cancer Screening  Colonoscopy    Fecal Occult Blood Test (FOBT)/Fecal Immunochemical Test (FIT)  Fecal DNA/Cologuard Test  Flexible Sigmoidoscopy Age: 45-75 years old   Colonoscopy: every 10 years (May be performed more frequently if at higher risk)  OR  FOBT/FIT: every 1 year  OR  Cologuard: every 3 years  OR  Sigmoidoscopy: every 5 years  Screening may be recommended earlier than age 45 if at higher risk for colorectal cancer. Also, an individualized decision between you and your healthcare provider will decide whether screening between the ages of 76-85 would be appropriate. Colonoscopy: Not on file  FOBT/FIT: Not on file  Cologuard: Not on file  Sigmoidoscopy: Not on file    Screening Not Indicated  History Colorectal Cancer     Prostate Cancer Screening Individualized decision between patient and health care provider in men between ages of 55-69   Medicare will cover every 12 months beginning on the day after your 50th birthday PSA: No results in last 5 years     History Prostate Cancer  Screening Not Indicated     Hepatitis C Screening Once for adults born between 1945 and 1965  More frequently in patients at high risk for Hepatitis C Hep C Antibody: Not on file        Diabetes Screening 1-2 times per year if you're at risk for diabetes or have pre-diabetes Fasting glucose: No results in last 5 years (No  results in last 5 years)  A1C: No results in last 5 years (No results in last 5 years)  Screening Current   Cholesterol Screening Once every 5 years if you don't have a lipid disorder. May order more often based on risk factors. Lipid panel: 01/08/2025  Screening Not Indicated  History Lipid Disorder      Other Preventive Screenings Covered by Medicare:  Abdominal Aortic Aneurysm (AAA) Screening: covered once if your at risk. You're considered to be at risk if you have a family history of AAA or a male between the age of 65-75 who smoking at least 100 cigarettes in your lifetime.  Lung Cancer Screening: covers low dose CT scan once per year if you meet all of the following conditions: (1) Age 55-77; (2) No signs or symptoms of lung cancer; (3) Current smoker or have quit smoking within the last 15 years; (4) You have a tobacco smoking history of at least 20 pack years (packs per day x number of years you smoked); (5) You get a written order from a healthcare provider.  Glaucoma Screening: covered annually if you're considered high risk: (1) You have diabetes OR (2) Family history of glaucoma OR (3)  aged 50 and older OR (4)  American aged 65 and older  Osteoporosis Screening: covered every 2 years if you meet one of the following conditions: (1) Have a vertebral abnormality; (2) On glucocorticoid therapy for more than 3 months; (3) Have primary hyperparathyroidism; (4) On osteoporosis medications and need to assess response to drug therapy.  HIV Screening: covered annually if you're between the age of 15-65. Also covered annually if you are younger than 15 and older than 65 with risk factors for HIV infection. For pregnant patients, it is covered up to 3 times per pregnancy.    Immunizations:  Immunization Recommendations   Influenza Vaccine Annual influenza vaccination during flu season is recommended for all persons aged >= 6 months who do not have contraindications   Pneumococcal Vaccine    * Pneumococcal conjugate vaccine = PCV13 (Prevnar 13), PCV15 (Vaxneuvance), PCV20 (Prevnar 20)  * Pneumococcal polysaccharide vaccine = PPSV23 (Pneumovax) Adults 19-65 yo with certain risk factors or if 65+ yo  If never received any pneumonia vaccine: recommend Prevnar 20 (PCV20)  Give PCV20 if previously received 1 dose of PCV13 or PPSV23   Hepatitis B Vaccine 3 dose series if at intermediate or high risk (ex: diabetes, end stage renal disease, liver disease)   Respiratory syncytial virus (RSV) Vaccine - COVERED BY MEDICARE PART D  * RSVPreF3 (Arexvy) CDC recommends that adults 60 years of age and older may receive a single dose of RSV vaccine using shared clinical decision-making (SCDM)   Tetanus (Td) Vaccine - COST NOT COVERED BY MEDICARE PART B Following completion of primary series, a booster dose should be given every 10 years to maintain immunity against tetanus. Td may also be given as tetanus wound prophylaxis.   Tdap Vaccine - COST NOT COVERED BY MEDICARE PART B Recommended at least once for all adults. For pregnant patients, recommended with each pregnancy.   Shingles Vaccine (Shingrix) - COST NOT COVERED BY MEDICARE PART B  2 shot series recommended in those 19 years and older who have or will have weakened immune systems or those 50 years and older     Health Maintenance Due:  There are no preventive care reminders to display for this patient.  Immunizations Due:      Topic Date Due   • COVID-19 Vaccine (5 - 2024-25 season) 09/01/2024     Advance Directives   What are advance directives?  Advance directives are legal documents that state your wishes and plans for medical care. These plans are made ahead of time in case you lose your ability to make decisions for yourself. Advance directives can apply to any medical decision, such as the treatments you want, and if you want to donate organs.   What are the types of advance directives?  There are many types of advance directives, and each state has  rules about how to use them. You may choose a combination of any of the following:  Living will:  This is a written record of the treatment you want. You can also choose which treatments you do not want, which to limit, and which to stop at a certain time. This includes surgery, medicine, IV fluid, and tube feedings.   Durable power of  for healthcare (DPAHC):  This is a written record that states who you want to make healthcare choices for you when you are unable to make them for yourself. This person, called a proxy, is usually a family member or a friend. You may choose more than 1 proxy.  Do not resuscitate (DNR) order:  A DNR order is used in case your heart stops beating or you stop breathing. It is a request not to have certain forms of treatment, such as CPR. A DNR order may be included in other types of advance directives.  Medical directive:  This covers the care that you want if you are in a coma, near death, or unable to make decisions for yourself. You can list the treatments you want for each condition. Treatment may include pain medicine, surgery, blood transfusions, dialysis, IV or tube feedings, and a ventilator (breathing machine).  Values history:  This document has questions about your views, beliefs, and how you feel and think about life. This information can help others choose the care that you would choose.  Why are advance directives important?  An advance directive helps you control your care. Although spoken wishes may be used, it is better to have your wishes written down. Spoken wishes can be misunderstood, or not followed. Treatments may be given even if you do not want them. An advance directive may make it easier for your family to make difficult choices about your care.   Fall Prevention    Fall prevention  includes ways to make your home and other areas safer. It also includes ways you can move more carefully to prevent a fall. Health conditions that cause changes in your  blood pressure, vision, or muscle strength and coordination may increase your risk for falls. Medicines may also increase your risk for falls if they make you dizzy, weak, or sleepy.   Fall prevention tips:   Stand or sit up slowly.    Use assistive devices as directed.    Wear shoes that fit well and have soles that .    Wear a personal alarm.    Stay active.    Manage your medical conditions.    Home Safety Tips:  Add items to prevent falls in the bathroom.    Keep paths clear.    Install bright lights in your home.    Keep items you use often on shelves within reach.    Paint or place reflective tape on the edges of your stairs.    Weight Management   Why it is important to manage your weight:  Being overweight increases your risk of health conditions such as heart disease, high blood pressure, type 2 diabetes, and certain types of cancer. It can also increase your risk for osteoarthritis, sleep apnea, and other respiratory problems. Aim for a slow, steady weight loss. Even a small amount of weight loss can lower your risk of health problems.  How to lose weight safely:  A safe and healthy way to lose weight is to eat fewer calories and get regular exercise. You can lose up about 1 pound a week by decreasing the number of calories you eat by 500 calories each day.   Healthy meal plan for weight management:  A healthy meal plan includes a variety of foods, contains fewer calories, and helps you stay healthy. A healthy meal plan includes the following:  Eat whole-grain foods more often.  A healthy meal plan should contain fiber. Fiber is the part of grains, fruits, and vegetables that is not broken down by your body. Whole-grain foods are healthy and provide extra fiber in your diet. Some examples of whole-grain foods are whole-wheat breads and pastas, oatmeal, brown rice, and bulgur.  Eat a variety of vegetables every day.  Include dark, leafy greens such as spinach, kale, kiki greens, and mustard greens.  Eat yellow and orange vegetables such as carrots, sweet potatoes, and winter squash.   Eat a variety of fruits every day.  Choose fresh or canned fruit (canned in its own juice or light syrup) instead of juice. Fruit juice has very little or no fiber.  Eat low-fat dairy foods.  Drink fat-free (skim) milk or 1% milk. Eat fat-free yogurt and low-fat cottage cheese. Try low-fat cheeses such as mozzarella and other reduced-fat cheeses.  Choose meat and other protein foods that are low in fat.  Choose beans or other legumes such as split peas or lentils. Choose fish, skinless poultry (chicken or turkey), or lean cuts of red meat (beef or pork). Before you cook meat or poultry, cut off any visible fat.   Use less fat and oil.  Try baking foods instead of frying them. Add less fat, such as margarine, sour cream, regular salad dressing and mayonnaise to foods. Eat fewer high-fat foods. Some examples of high-fat foods include french fries, doughnuts, ice cream, and cakes.  Eat fewer sweets.  Limit foods and drinks that are high in sugar. This includes candy, cookies, regular soda, and sweetened drinks.  Exercise:  Exercise at least 30 minutes per day on most days of the week. Some examples of exercise include walking, biking, dancing, and swimming. You can also fit in more physical activity by taking the stairs instead of the elevator or parking farther away from stores. Ask your healthcare provider about the best exercise plan for you.   Narcotic (Opioid) Safety    Use narcotics safely:  Take prescribed narcotics exactly as directed  Do not give narcotics to others or take narcotics that belong to someone else  Do not mix narcotics without medicines or alcohol  Do not drive or operate heavy machinery after you take the narcotic  Monitor for side effects and notify your healthcare provider if you experienced side effects such as nausea, sleepiness, itching, or trouble thinking clearly.    Manage constipation:    Constipation  is the most common side effect of narcotic medicine. Constipation is when you have hard, dry bowel movements, or you go longer than usual between bowel movements. Tell your healthcare provider about all changes in your bowel movements while you are taking narcotics. He or she may recommend laxative medicine to help you have a bowel movement. He or she may also change the kind of narcotic you are taking, or change when you take it. The following are more ways you can prevent or relieve constipation:    Drink liquids as directed.  You may need to drink extra liquids to help soften and move your bowels. Ask how much liquid to drink each day and which liquids are best for you.  Eat high-fiber foods.  This may help decrease constipation by adding bulk to your bowel movements. High-fiber foods include fruits, vegetables, whole-grain breads and cereals, and beans. Your healthcare provider or dietitian can help you create a high-fiber meal plan. Your provider may also recommend a fiber supplement if you cannot get enough fiber from food.  Exercise regularly.  Regular physical activity can help stimulate your intestines. Walking is a good exercise to prevent or relieve constipation. Ask which exercises are best for you.  Schedule a time each day to have a bowel movement.  This may help train your body to have regular bowel movements. Bend forward while you are on the toilet to help move the bowel movement out. Sit on the toilet for at least 10 minutes, even if you do not have a bowel movement.    Store narcotics safely:   Store narcotics where others cannot easily get them.  Keep them in a locked cabinet or secure area. Do not  keep them in a purse or other bag you carry with you. A person may be looking for something else and find the narcotics.  Make sure narcotics are stored out of the reach of children.  A child can easily overdose on narcotics. Narcotics may look like candy to a small child.    The best way to dispose  of narcotics:      The laws vary by country and area. In the [place], the best way is to return the narcotics through a take-back program. This program is offered by the [place] Drug Enforcement Agency (PAULO). The following are options for using the program:  Take the narcotics to a [employer] collection site.  The site is often a law enforcement center. Call your local law enforcement center for scheduled take-back days in your area. You will be given information on where to go if the collection site is in a different location.  Take the narcotics to an approved pharmacy or hospital.  A pharmacy or hospital may be set up as a collection site. You will need to ask if it is a [employer] collection site if you were not directed there. A pharmacy or doctor's office may not be able to take back narcotics unless it is a [employer] site.  Use a mail-back system.  This means you are given containers to put the narcotics into. You will then mail them in the containers.  Use a take-back drop box.  This is a place to leave the narcotics at any time. People and animals will not be able to get into the box. Your local law enforcement agency can tell you where to find a drop box in your area.    Other ways to manage pain:   Ask your healthcare provider about non-narcotic medicines to control pain.  Nonprescription medicines include NSAIDs (such as ibuprofen) and acetaminophen. Prescription medicines include muscle relaxers, antidepressants, and steroids.  Pain may be managed without any medicines.  Some ways to relieve pain include massage, aromatherapy, or meditation. Physical or occupational therapy may also help.    For more information:   Drug Enforcement Administration  [address] [place] [address]  Phone: 3- 209 - 199-2214  Web Address: https://www.deadiversion.[employer].gov/drug_disposal/    [place] Food and Drug Administration  [address] [place] [address]  Phone: 6- 632 - 332-0268  Web Address:  http://www.fda.gov     © Copyright [employer] 2018 Information is for End User's use only and may not be sold, redistributed or otherwise used for commercial purposes. All illustrations and images included in CareNotes® are the copyrighted property of [employer] or [employer]

## 2025-04-07 NOTE — ASSESSMENT & PLAN NOTE
Patient denies symptoms though his chronic cough may be related to this. Not currently taking PPI or H2RA. Will continue to monitor.

## 2025-04-07 NOTE — ASSESSMENT & PLAN NOTE
Recent Cardiac cath showing mid LAD lesion 60% stenosed. Continue aspirin, statin, ACEi, beta blocker.

## 2025-04-07 NOTE — PROGRESS NOTES
Name: Dante Mckinney      : 1940      MRN: [id number]  Encounter Provider: Severino Hilton MD  Encounter Date: 2025   Encounter department: [facility]  :  Assessment & Plan  Medicare annual wellness visit, subsequent  Immunizations: UTD  Labs: BMP, A1c  Screening: Aged out of cancer screening        Upper airway cough syndrome  Suspect the most likely cause of his chronic cough vs LPR.  Continue daily flonase, claritin  Tessalon perles PRN  Refer to ENT for further evaluation    Orders:    Ambulatory Referral to Otolaryngology; Future    benzonatate (TESSALON PERLES) 100 mg capsule; Take 1 capsule (100 mg total) by mouth 3 (three) times a day as needed for cough    Mixed hyperlipidemia  TG 84, LDL 40 on labs 3 months ago. Continue Crestor 10 mg qd.       Atherosclerosis of native coronary artery of native heart without angina pectoris  Recent Cardiac cath showing mid LAD lesion 60% stenosed. Continue aspirin, statin, ACEi, beta blocker.       Hyperglycemia  Seen on labs glucose of 156 3 months ago. However, patient was not fasting and he was on steroids for sinusitis. Recheck FBG and check A1c.    Orders:    Hemoglobin A1C; Future    Basic metabolic panel; Future    Gastroesophageal reflux disease without esophagitis  Patient denies symptoms though his chronic cough may be related to this. Not currently taking PPI or H2RA. Will continue to monitor.       Chronic obstructive pulmonary disease, unspecified COPD type (HCC)  Following with Pulmonology. Recent PFTs showing dysanapsis. Currently using albuterol PRN. Starting maintenance inhaler was not recommended at recent appt with Pulmonology. Continue follow up with them.       Obstructive sleep apnea syndrome  On CPAP. Is going for repeat sleep study with Pulmonology to determine needs for CPAP moving forward.       Primary hypertension  BP well controlled. Continue current dose of losartan and Toprol XL.         Depression  Screening and Follow-up Plan: Patient was screened for depression during today's encounter. They screened negative with a PHQ-2 score of 0.      Falls Plan of Care: balance, strength, and gait training instructions were provided.       Preventive health issues were discussed with patient, and age appropriate screening tests were ordered as noted in patient's After Visit Summary. Personalized health advice and appropriate referrals for health education or preventive services given if needed, as noted in patient's After Visit Summary.    History of Present Illness     Dante Mckinney is an 85 yo M with PMC of CRC s/p resection, prostate cancer s/p resection, malignant melanoma, AURE on CPAP, HLD, BRCA1 who presents today for AWV. Still having issues with chronic cough that has been going on for months now.       Patient Care Team:  Severino Hilton MD as PCP - General (Internal Medicine)  Patsy Long MD (Pulmonology)  Stephen Simon MD (Pain Medicine)  Pedro Liriano DO (Dermatology)  Tin Lara MD (Cardiology)    Review of Systems   Constitutional:  Negative for appetite change, chills and fever.   HENT:  Negative for congestion and sore throat.    Eyes:  Negative for visual disturbance.   Respiratory:  Positive for cough. Negative for shortness of breath.    Cardiovascular:  Negative for chest pain and leg swelling.   Gastrointestinal:  Negative for abdominal pain, constipation, diarrhea and nausea.   Genitourinary:  Negative for difficulty urinating and dysuria.   Musculoskeletal:  Positive for back pain and gait problem. Negative for arthralgias and myalgias.   Skin:  Negative for rash.   Neurological:  Negative for dizziness, light-headedness and headaches.   Psychiatric/Behavioral:  Negative for confusion.      Medical History Reviewed by provider this encounter:  Meds  Problems       Annual Wellness Visit Questionnaire   Dante is here for his Subsequent Wellness visit.     Health Risk Assessment:   Patient  rates overall health as good. Patient feels that their physical health rating is slightly worse. Patient is satisfied with their life. Eyesight was rated as slightly worse. Hearing was rated as same. Patient feels that their emotional and mental health rating is same. Patients states they are never, rarely angry. Patient states they are sometimes unusually tired/fatigued. Pain experienced in the last 7 days has been some. Patient's pain rating has been 4/10. Patient states that he has experienced no weight loss or gain in last 6 months.     Depression Screening:   PHQ-2 Score: 0      Fall Risk Screening:   In the past year, patient has experienced: history of falling in past year      Home Safety:  Patient does not have trouble with stairs inside or outside of their home. Patient has working smoke alarms and has working carbon monoxide detector. Home safety hazards include: none.     Nutrition:   Current diet is Regular and Frequent junk food.     Medications:   Patient is not currently taking any over-the-counter supplements. Patient is able to manage medications.     Activities of Daily Living (ADLs)/Instrumental Activities of Daily Living (IADLs):   Walk and transfer into and out of bed and chair?: Yes  Dress and groom yourself?: Yes    Bathe or shower yourself?: Yes    Feed yourself? Yes  Do your laundry/housekeeping?: Yes  Manage your money, pay your bills and track your expenses?: Yes  Make your own meals?: No    Do your own shopping?: Yes    Previous Hospitalizations:   Any hospitalizations or ED visits within the last 12 months?: No      Advance Care Planning:   Living will: Yes    Durable POA for healthcare: Yes    Advanced directive: Yes    Advanced directive counseling given: Yes      PREVENTIVE SCREENINGS      Cardiovascular Screening:    General: Screening Not Indicated and History Lipid Disorder      Diabetes Screening:     General: Screening Current      Colorectal Cancer Screening:     General:  Screening Not Indicated and History Colorectal Cancer      Prostate Cancer Screening:    General: History Prostate Cancer and Screening Not Indicated      Osteoporosis Screening:    General: Screening Not Indicated and History Osteoporosis      Abdominal Aortic Aneurysm (AAA) Screening:    Risk factors include: tobacco use        Lung Cancer Screening:     General: Screening Not Indicated    Screening, Brief Intervention, and Referral to Treatment (SBIRT)     Screening  Typical number of drinks in a day: 0  Typical number of drinks in a week: 2  Interpretation: Low risk drinking behavior.    AUDIT-C Screenin) How often did you have a drink containing alcohol in the past year? 2 to 3 times a week  2) How many drinks did you have on a typical day when you were drinking in the past year? 0  3) How often did you have 6 or more drinks on one occasion in the past year? never    AUDIT-C Score: 3  Interpretation: Score 0-3 (male): Negative screen for alcohol misuse    Single Item Drug Screening:  How often have you used an illegal drug (including marijuana) or a prescription medication for non-medical reasons in the past year? never    Single Item Drug Screen Score: 0  Interpretation: Negative screen for possible drug use disorder    Review of Current Opioid Use    Opioid Risk Tool (ORT) Interpretation: Complete Opioid Risk Tool (ORT)    Social Drivers of Health     Food Insecurity: No Food Insecurity (2025)    Hunger Vital Sign     Worried About Running Out of Food in the Last Year: Never true     Ran Out of Food in the Last Year: Never true   Transportation Needs: No Transportation Needs (2025)    PRAPARE - Transportation     Lack of Transportation (Medical): No     Lack of Transportation (Non-Medical): No   Housing Stability: Low Risk  (2025)    Housing Stability Vital Sign     Unable to Pay for Housing in the Last Year: No     Number of Times Moved in the Last Year: 1     Homeless in the Last Year: No    Utilities: Not At Risk (4/2/2025)    [facility] Utilities     Threatened with loss of utilities: No     No results found.    Objective   /66 (BP Location: Right arm, Patient Position: Sitting, Cuff Size: Standard)   Pulse 90   Temp 97.8 °F (36.6 °C) (Tympanic)   Resp 18   Ht 6' (1.829 m)   Wt 87.8 kg (193 lb 9.6 oz)   SpO2 95%   BMI 26.26 kg/m²     Physical Exam  Constitutional:       General: He is not in acute distress.  HENT:      Right Ear: Tympanic membrane, ear canal and external ear normal.      Left Ear: Tympanic membrane, ear canal and external ear normal.      Nose: Congestion present.      Mouth/Throat:      Mouth: Mucous membranes are moist.      Pharynx: Oropharynx is clear.   Eyes:      Conjunctiva/sclera: Conjunctivae normal.      Pupils: Pupils are equal, round, and reactive to light.   Neck:      Thyroid: No thyroid mass or thyroid tenderness.   Cardiovascular:      Rate and Rhythm: Normal rate and regular rhythm.      Heart sounds: Normal heart sounds.   Pulmonary:      Effort: Pulmonary effort is normal.      Breath sounds: Normal breath sounds.   Abdominal:      General: There is no distension.      Tenderness: There is no abdominal tenderness.   Musculoskeletal:      Cervical back: Neck supple.      Right lower leg: No edema.      Left lower leg: No edema.   Skin:     General: Skin is warm and dry.   Neurological:      Mental Status: He is alert.      Sensory: Sensation is intact.      Motor: Motor function is intact.      Gait: Gait abnormal.      Comments: Uses cane for ambulation   Psychiatric:         Mood and Affect: Mood normal.         Speech: Speech normal.         Behavior: Behavior normal. Behavior is cooperative.

## 2025-04-07 NOTE — ASSESSMENT & PLAN NOTE
Following with Pulmonology. Recent PFTs showing dysanapsis. Currently using albuterol PRN. Starting maintenance inhaler was not recommended at recent appt with Pulmonology. Continue follow up with them.

## 2025-04-10 ENCOUNTER — TELEPHONE (OUTPATIENT)
Age: 85
End: 2025-04-10

## 2025-04-10 DIAGNOSIS — M48.061 SPINAL STENOSIS OF LUMBAR REGION, UNSPECIFIED WHETHER NEUROGENIC CLAUDICATION PRESENT: ICD-10-CM

## 2025-04-10 DIAGNOSIS — M54.16 LUMBAR RADICULOPATHY: ICD-10-CM

## 2025-04-10 RX ORDER — PREGABALIN 50 MG/1
50 CAPSULE ORAL 3 TIMES DAILY
Qty: 90 CAPSULE | Refills: 0 | Status: SHIPPED | OUTPATIENT
Start: 2025-04-10 | End: 2025-04-24 | Stop reason: SDUPTHER

## 2025-04-10 NOTE — TELEPHONE ENCOUNTER
Caller:Shelli mensah/ [facility]     Doctor: Dr. Simon     Reason for call: Received the lyrica script but when checking the PDMP noticed the patient had a recent lyrica 25mg script filled at the [facility] b another provider. States it was 90 capsules for a 30 day supply.    Wanting to see if provider is aware.     Call back#: [phone]

## 2025-04-10 NOTE — TELEPHONE ENCOUNTER
Please review  New Lyrica Rx with increased dosage should have gone to [facility], not [facility]

## 2025-04-10 NOTE — TELEPHONE ENCOUNTER
Caller: Patient    Doctor: Dr. Simon    Reason for call: Patient called back to verify which pharmacy Pregabalin was send to. Advise [facility]  was canceled and script was send to [facility]. [facility]    Call back#:

## 2025-04-14 ENCOUNTER — TELEPHONE (OUTPATIENT)
Age: 85
End: 2025-04-14

## 2025-04-14 NOTE — TELEPHONE ENCOUNTER
Patient received a letter about his CT results and having to follow up with us. I advised patient that he has an appointment on 5/2 with Dr. Long and she can discuss the results with him at the appointment. Patient was advised to disregard the letter since he has an appointment with the provider.      Thank you.

## 2025-04-18 ENCOUNTER — TELEPHONE (OUTPATIENT)
Age: 85
End: 2025-04-18

## 2025-04-18 NOTE — TELEPHONE ENCOUNTER
Caller: Dante PT    Doctor: Dr. Simon     Reason for call: The pt wanted to let the doctor know that the Lyrica is not working is there something else that he can take. Please advise      Call back#: [phone]

## 2025-04-18 NOTE — TELEPHONE ENCOUNTER
S/w pt. States he has been taking Increased dosage of 50mg tid for at least 7-10 days.  States no improvement or side effects.  Pt is agreeable ti increasing further or repeating R TFESI done in December.    Please advise on next step

## 2025-04-21 NOTE — PROGRESS NOTES
Pain Medicine Follow-Up Note    Assessment:  1. Spinal stenosis of lumbar region, unspecified whether neurogenic claudication present    2. Lumbar radiculopathy    3. Muscle spasm    4. Muscle spasm of back    5. Cervical spondylosis    6. Cervical radiculopathy    7. Neck pain        Plan:    New Medications Ordered This Visit   Medications   • pregabalin (LYRICA) 50 mg capsule     Sig: Take 1 capsule (50 mg total) by mouth 2 (two) times a day AND 2 capsules (100 mg total) daily at bedtime.     Dispense:  120 capsule     Refill:  2   • methocarbamol (ROBAXIN) 500 mg tablet     Sig: Take 1 tablet (500 mg total) by mouth daily at bedtime as needed for muscle spasms     Dispense:  30 tablet     Refill:  0       My impressions and treatment recommendations were discussed in detail with the patient who verbalized understanding and had no further questions.      Patient returns to the office after increasing pregabalin to 50 mg 3 times daily patient reports no side effects but also reports no efficacy.  Patient does not have pain during the day for the most part his main concern is at bedtime when laying flat he has a sharp intense pain that shoots down his right leg to the ball of his foot patient also states that his neck and right arm have also worsened at night as well.  Patient did have a CT scan of his neck in February which showed multiple pain generators advised the patient that he may benefit from a cervical epidural steroid injection however since the patient did not find them effective in his lumbar spine patient is reluctant to trial them in the neck.  Patient also has significant facet arthropathy and would benefit from a medial branch block to radiofrequency ablation of his right C4-C6.  I will provide the patient a home exercise plan as well as pamphlets on the medial branch block to radiofrequency ablation procedure when patient follows up in 4 weeks we can discuss if he would like to pursue this  option.    Since the patient is not experiencing any side effects I recommend that we continue to titrate pregabalin up by taking 50 mg twice daily and 100 mg at bedtime, patient also reports that he is using up to 3000 mg of Tylenol in the matter of 8 hours overnight.  Patient is aware that this is too much Tylenol advised the patient to only take 1000 mg at bedtime and not take it again until least 8 hours later.  I will provide the patient methocarbamol 500 mg tablet advised the patient that he may use this in the middle of the night should he wake up and have pain.  Patient understands that this is a muscle relaxer and that he should not drive or operate machinery and that he should be cautious in case of dizziness or drowsiness.  Advised the patient that we can continue to titrate pregabalin up a little bit further before considering it and effective I would then consider low-dose opioid treatment.  Patient verbalized understanding.     [place] Prescription Drug Monitoring Program report was reviewed and was appropriate     Follow-up is planned in  4 weeks time or sooner as warranted.  Discharge instructions were provided. I personally saw and examined the patient and I agree with the above discussed plan of care.    History of Present Illness:    Dante cMkinney is a 84 y.o. male who presents to [facility] for interval re-evaluation of the above stated pain complaints. The patient has a past medical and chronic pain history as outlined in the assessment section. He was last seen on 2/21/2025.    At today's visit patient states that their pain symptoms are the same with a pain score of 0/10 on the verbal numeric pain scale right now but pain is severe at bedtime when laying flat.  The patient's pain is worse at night.  The patient's pain is intermittent in nature.  And the quality of the patient's pain is described as sharp.  The patient's pain is located in the neck, right arm into  hand, and right leg.  Patient states he Ramya pain relief he is obtaining from his current pain relievers is 0, patient is using pregabalin 50 mg 3 times daily along with Tylenol.    Other than as stated above, the patient denies any interval changes in medications, medical condition, mental condition, symptoms, or allergies since the last office visit.         Review of Systems:    Review of Systems   Respiratory:  Negative for shortness of breath.    Cardiovascular:  Negative for chest pain.   Gastrointestinal:  Negative for constipation, diarrhea, nausea and vomiting.   Musculoskeletal:  Positive for gait problem. Negative for arthralgias, joint swelling and myalgias.   Skin:  Negative for rash.   Neurological:  Negative for dizziness, seizures and weakness.   All other systems reviewed and are negative.        Past Medical History:   Diagnosis Date   • Arthritis    • BRCA1 positive    • Cancer (HCC)    • Colon cancer (HCC)    • Colorectal cancer (HCC)    • COPD (chronic obstructive pulmonary disease) (HCC)    • Macular degeneration     both eyes   • Melanoma in situ of cheek (HCC)    • Osteoarthritis 2014   • Skin cancer    • Skin tag    • Sleep apnea, obstructive        Past Surgical History:   Procedure Laterality Date   • CARDIAC CATHETERIZATION Left 01/14/2025    Procedure: Cardiac Left Heart Cath;  Surgeon: Tin Lara MD;  Location: [facility] CARD[facility];  Service: Cardiology   • CARDIAC CATHETERIZATION N/A 01/14/2025    Procedure: Cardiac Coronary Angiogram;  Surgeon: Tin Lara MD;  Location: [facility];  Service: Cardiology   • CARDIAC CATHETERIZATION  01/14/2025    Procedure: Left Heart Catherization;  Surgeon: Tin Lara MD;  Location: [facility];  Service: Cardiology   • CARDIAC CATHETERIZATION N/A 01/14/2025    Procedure: Cardiac FFR/IFR;  Surgeon: Tin Lara MD;  Location: [facility]ARDI[facility];  Service: Cardiology   • CATARACT EXTRACTION Bilateral     2015   • COLON  SURGERY     • COLONOSCOPY     • EYE SURGERY     • JOINT REPLACEMENT     • LAPAROSCOPIC COLON RESECTION     • PROSTATE BIOPSY       and    • PROSTATE SURGERY     • REPLACEMENT TOTAL KNEE Left        • REPLACEMENT TOTAL KNEE Right        • SKIN CANCER EXCISION     • TOTAL HIP ARTHROPLASTY             Family History   Problem Relation Age of Onset   • Cancer Mother    • Heart disease Father    • Coronary artery disease Father    • Hearing loss Father    • Breast cancer Sister    • Cancer Sister         Breast & Colon   • Colorectal Cancer Maternal Grandmother    • Hodgkin's lymphoma Brother    • Cancer Brother         cancer   • Breast cancer Sister    • Colon cancer Sister    • BRCA1 Positive Sister    • Cancer Sister         MS   • Cancer Sister         MS   • Osteoporosis Sister         MS       Social History     Occupational History   • Not on file   Tobacco Use   • Smoking status: Former     Current packs/day: 0.00     Average packs/day: 0.5 packs/day for 34.7 years (17.4 ttl pk-yrs)     Types: Cigarettes, Pipe     Start date: 1960     Quit date: 10/8/1994     Years since quittin.5     Passive exposure: Never   • Smokeless tobacco: Never   Vaping Use   • Vaping status: Never Used   Substance and Sexual Activity   • Alcohol use: Yes     Alcohol/week: 4.0 standard drinks of alcohol     Types: 4 Shots of liquor per week   • Drug use: Never   • Sexual activity: Not Currently     Partners: Female     Birth control/protection: Surgical, Male Sterilization         Current Outpatient Medications:   •  albuterol (Ventolin HFA) 90 mcg/act inhaler, Inhale 2 puffs every 6 (six) hours as needed for wheezing, Disp: 18 g, Rfl: 5  •  amLODIPine (NORVASC) 2.5 mg tablet, Take 2.5 mg by mouth daily, Disp: , Rfl:   •  Calcium Carbonate Antacid 400 MG CHEW, Chew, Disp: , Rfl:   •  Cholecalciferol 100 MCG (4000 UT) TABS, Take 2 tablets by mouth daily, Disp: , Rfl:   •  fluticasone (FLONASE) 50  mcg/act nasal spray, 2 sprays into each nostril daily, Disp: , Rfl:   •  loratadine (CLARITIN) 10 mg tablet, Take 10 mg by mouth, Disp: , Rfl:   •  losartan (COZAAR) 25 mg tablet, Take 25 mg by mouth, Disp: , Rfl:   •  methocarbamol (ROBAXIN) 500 mg tablet, Take 1 tablet (500 mg total) by mouth daily at bedtime as needed for muscle spasms, Disp: 30 tablet, Rfl: 0  •  pregabalin (LYRICA) 50 mg capsule, Take 1 capsule (50 mg total) by mouth 2 (two) times a day AND 2 capsules (100 mg total) daily at bedtime., Disp: 120 capsule, Rfl: 2  •  rosuvastatin (CRESTOR) 10 MG tablet, Take 10 mg by mouth, Disp: , Rfl:     Allergies   Allergen Reactions   • Balance B-50 Dizziness   • Omeprazole Other (See Comments)   • Prednisone Other (See Comments)   • Simvastatin Myalgia       Physical Exam:    Ht 6' (1.829 m)   Wt 87.5 kg (193 lb)   BMI 26.18 kg/m²     Constitutional:normal, well developed, well nourished, alert, in no distress and non-toxic and no overt pain behavior.  Eyes:anicteric  HEENT:grossly intact  Neck:supple, symmetric, trachea midline and no masses   Pulmonary:even and unlabored  Cardiovascular:No edema or pitting edema present  Skin:Normal without rashes or lesions and well hydrated  Psychiatric:Mood and affect appropriate  Neurologic:Cranial Nerves II-XII grossly intact  Musculoskeletal:antalgic, shuffling, stooped posture, and ambulates with cane    Cervical Spine Exam    Appearance:  Normal lordosis  Palpation/Tenderness:  right cervical paraspinal tenderness  Range of Motion:  Flexion:  No limitation  without pain  Extension:  Moderately limited  with pain  Lateral Flexion - Left:  Minimally limited  with pain  Lateral Flexion - Right:  Moderately limited  with pain  Rotation - Left:  Minimally limited  with pain  Rotation - Right:  Moderately limited  with pain      This document was created using speech voice recognition software.   Grammatical errors, random word insertions, pronoun errors, and  incomplete sentences are an occasional consequence of this system due to software limitations, ambient noise, and hardware issues.   Any formal questions or concerns about content, text, or information contained within the body of this dictation should be directly addressed to the provider for clarification.

## 2025-04-21 NOTE — TELEPHONE ENCOUNTER
Caller: Dante    Doctor: Dr. Simon    Reason for call: pt is calling for the status of a message from 04/18. Pt states the lyrica isn't working for his pain.Pt is asking what the next steps would be. Please advise pt.     Call back#: [phone]

## 2025-04-24 ENCOUNTER — OFFICE VISIT (OUTPATIENT)
Dept: PAIN MEDICINE | Facility: CLINIC | Age: 85
End: 2025-04-24
Payer: MEDICARE

## 2025-04-24 VITALS — WEIGHT: 193 LBS | BODY MASS INDEX: 26.14 KG/M2 | HEIGHT: 72 IN

## 2025-04-24 DIAGNOSIS — M47.812 CERVICAL SPONDYLOSIS: ICD-10-CM

## 2025-04-24 DIAGNOSIS — M62.830 MUSCLE SPASM OF BACK: ICD-10-CM

## 2025-04-24 DIAGNOSIS — M62.838 MUSCLE SPASM: ICD-10-CM

## 2025-04-24 DIAGNOSIS — M54.12 CERVICAL RADICULOPATHY: ICD-10-CM

## 2025-04-24 DIAGNOSIS — M48.061 SPINAL STENOSIS OF LUMBAR REGION, UNSPECIFIED WHETHER NEUROGENIC CLAUDICATION PRESENT: Primary | ICD-10-CM

## 2025-04-24 DIAGNOSIS — M54.16 LUMBAR RADICULOPATHY: ICD-10-CM

## 2025-04-24 DIAGNOSIS — M54.2 NECK PAIN: ICD-10-CM

## 2025-04-24 PROCEDURE — 99214 OFFICE O/P EST MOD 30 MIN: CPT

## 2025-04-24 RX ORDER — METHOCARBAMOL 500 MG/1
500 TABLET, FILM COATED ORAL
Qty: 30 TABLET | Refills: 0 | Status: SHIPPED | OUTPATIENT
Start: 2025-04-24

## 2025-04-24 RX ORDER — PREGABALIN 50 MG/1
CAPSULE ORAL
Qty: 120 CAPSULE | Refills: 2 | Status: SHIPPED | OUTPATIENT
Start: 2025-04-24

## 2025-04-28 ENCOUNTER — TELEPHONE (OUTPATIENT)
Age: 85
End: 2025-04-28

## 2025-04-28 DIAGNOSIS — M54.16 LUMBAR RADICULOPATHY: ICD-10-CM

## 2025-04-28 DIAGNOSIS — M48.061 SPINAL STENOSIS OF LUMBAR REGION, UNSPECIFIED WHETHER NEUROGENIC CLAUDICATION PRESENT: ICD-10-CM

## 2025-04-28 NOTE — TELEPHONE ENCOUNTER
Caller: Dante     Doctor: Dr. Simon     Reason for call: Patient calling in regards to neck exercise patient stating unable to pull them up in my please advise     Call back#: [phone]

## 2025-04-28 NOTE — TELEPHONE ENCOUNTER
S/w pt and advised exercises are in AVS. Pt states he is having difficulty downloading it. Advised [facility] will mail AVS to address on file.

## 2025-04-29 ENCOUNTER — RESULTS FOLLOW-UP (OUTPATIENT)
Age: 85
End: 2025-04-29

## 2025-05-02 ENCOUNTER — APPOINTMENT (OUTPATIENT)
Age: 85
End: 2025-05-02
Attending: INTERNAL MEDICINE
Payer: MEDICARE

## 2025-05-02 ENCOUNTER — OFFICE VISIT (OUTPATIENT)
Age: 85
End: 2025-05-02
Payer: MEDICARE

## 2025-05-02 VITALS
HEIGHT: 72 IN | DIASTOLIC BLOOD PRESSURE: 80 MMHG | OXYGEN SATURATION: 98 % | WEIGHT: 196 LBS | SYSTOLIC BLOOD PRESSURE: 124 MMHG | BODY MASS INDEX: 26.55 KG/M2 | HEART RATE: 71 BPM | TEMPERATURE: 97.6 F

## 2025-05-02 DIAGNOSIS — R06.02 SOB (SHORTNESS OF BREATH): ICD-10-CM

## 2025-05-02 DIAGNOSIS — J43.9 LUNG BULLAE (HCC): ICD-10-CM

## 2025-05-02 DIAGNOSIS — J31.0 CHRONIC RHINITIS: ICD-10-CM

## 2025-05-02 DIAGNOSIS — R73.9 HYPERGLYCEMIA: ICD-10-CM

## 2025-05-02 DIAGNOSIS — R06.02 SOB (SHORTNESS OF BREATH): Primary | ICD-10-CM

## 2025-05-02 LAB
ANION GAP SERPL CALCULATED.3IONS-SCNC: 7 MMOL/L (ref 4–13)
BUN SERPL-MCNC: 21 MG/DL (ref 5–25)
CALCIUM SERPL-MCNC: 10 MG/DL (ref 8.4–10.2)
CHLORIDE SERPL-SCNC: 107 MMOL/L (ref 96–108)
CO2 SERPL-SCNC: 28 MMOL/L (ref 21–32)
CREAT SERPL-MCNC: 0.98 MG/DL (ref 0.6–1.3)
EST. AVERAGE GLUCOSE BLD GHB EST-MCNC: 108 MG/DL
GFR SERPL CREATININE-BSD FRML MDRD: 69 ML/MIN/1.73SQ M
GLUCOSE SERPL-MCNC: 85 MG/DL (ref 65–140)
HBA1C MFR BLD: 5.4 %
POTASSIUM SERPL-SCNC: 4.5 MMOL/L (ref 3.5–5.3)
SODIUM SERPL-SCNC: 142 MMOL/L (ref 135–147)

## 2025-05-02 PROCEDURE — 99214 OFFICE O/P EST MOD 30 MIN: CPT | Performed by: INTERNAL MEDICINE

## 2025-05-02 PROCEDURE — 36415 COLL VENOUS BLD VENIPUNCTURE: CPT

## 2025-05-02 PROCEDURE — 83036 HEMOGLOBIN GLYCOSYLATED A1C: CPT

## 2025-05-02 PROCEDURE — 82785 ASSAY OF IGE: CPT

## 2025-05-02 PROCEDURE — 80048 BASIC METABOLIC PNL TOTAL CA: CPT

## 2025-05-02 PROCEDURE — 86003 ALLG SPEC IGE CRUDE XTRC EA: CPT

## 2025-05-02 NOTE — PROGRESS NOTES
Name: Dante Mckinney      : 1940      MRN: [id number]  Encounter Provider: Patsy Long MD  Encounter Date: 2025   Encounter department: [facility]  :  Assessment & Plan  SOB (shortness of breath)  Shortness of breath likely multifactorial secondary to his air-trapping and significant bullae in the chest x-ray, CT of the chest also demonstrating a significant bullae in the right middle lobe  Given recent CT of the chest also demonstrating mild diffuse bronchial thickening few scattered micronodules given his chronic cough will plan for respiratory allergy panel with IgE and follow-up  PFTs 3/25/2025 with spirometry demonstrating dysnaypsis, propensity for obstructive lung disease no postbronchodilator response air trapping and hyperinflation normal DLCO flow-volume loop consistent with obstruction    Orders:    [facility] Allergy Panel, Adult; Future    Lung bullae (HCC)  CT of the chest done 2025 no prior CT of the chest to compare they did compare it with a chest x-ray that was done in 2025 with a large bullae in the right lower lung measuring 11 cm x 12 cm  Prior CT of the chest report available from [place] patient did bring in the copies that were done in 2023 May and 2023 reports of which we have scanned it into the media here, there was no mentioning of any bullae in that CT of the chest report although there was mention of mild emphysematous changes  Given significantly big bullae I have discussed with him for repeating a CT of the chest in 3 months and follow-up to see for stability  Also placed in a thoracic surgery consult  Given red flag signs if any sudden onset chest pain or sudden onset shortness of breath to go into the ER right away discussed regarding air leak and development of a pneumothorax and lung collapse understands and verbalizes  Orders:    Ambulatory Referral to Thoracic Surgery; Future    CT chest wo contrast;  Future    Chronic rhinitis  Chronic rhinitis with CT of the chest demonstrating mild diffuse bronchial thickening and the chronic cough follow-up with respiratory allergy panel with IgE he does have an appointment coming up next month with ENT  Orders:    [facility] Allergy Panel, Adult; Future        History of Present Illness   HPI  Dante Mckinney is a 84 y.o. 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  Negative for appetite change and fever.   HENT: Negative.  Negative for ear pain, postnasal drip, rhinorrhea, sneezing, sore throat and trouble swallowing.    Eyes: Negative.    Respiratory:  Positive for cough and shortness of breath.    Cardiovascular: Negative.  Negative for chest pain.   Gastrointestinal: Negative.    Endocrine: Negative.    Genitourinary: Negative.    Musculoskeletal: Negative.  Negative for myalgias.   Allergic/Immunologic: Negative.    Neurological: Negative.  Negative for headaches.   Psychiatric/Behavioral: Negative.       Medical History Reviewed by provider this encounter:  Problems     .     Objective   /80   Pulse 71   Temp 97.6 °F (36.4 °C)   Ht 6' (1.829 m)   Wt 88.9 kg (196 lb)   SpO2 98%   BMI 26.58 kg/m²      Physical Exam  Vitals  and nursing note reviewed.   Constitutional:       Appearance: He is well-developed.   HENT:      Head: Normocephalic and atraumatic.   Eyes:      Conjunctiva/sclera: Conjunctivae normal.      Pupils: Pupils are equal, round, and reactive to light.   Neck:      Thyroid: No thyromegaly.      Vascular: No JVD.   Cardiovascular:      Rate and Rhythm: Normal rate and regular rhythm.      Heart sounds: Normal heart sounds. No murmur heard.     No friction rub. No gallop.   Pulmonary:      Effort: Pulmonary effort is normal. No respiratory distress.      Breath sounds: Normal breath sounds. No wheezing or rales.   Chest:      Chest wall: No tenderness.   Musculoskeletal:         General: No tenderness or deformity. Normal range of motion.      Cervical back: Normal range of motion and neck supple.   Lymphadenopathy:      Cervical: No cervical adenopathy.   Skin:     General: Skin is warm and dry.   Neurological:      Mental Status: He is alert and oriented to person, place, and time.

## 2025-05-05 ENCOUNTER — PATIENT MESSAGE (OUTPATIENT)
Dept: PAIN MEDICINE | Facility: CLINIC | Age: 85
End: 2025-05-05

## 2025-05-05 DIAGNOSIS — M47.812 CERVICAL SPONDYLOSIS: Primary | ICD-10-CM

## 2025-05-05 LAB
A ALTERNATA IGE QN: <0.1 KUA/I (ref 0–0.1)
A FUMIGATUS IGE QN: <0.1 KUA/I (ref 0–0.1)
BERMUDA GRASS IGE QN: <0.1 KUA/I (ref 0–0.1)
BOXELDER IGE QN: <0.1 KUA/I (ref 0–0.1)
C HERBARUM IGE QN: <0.1 KUA/I (ref 0–0.1)
CAT DANDER IGE QN: <0.1 KUA/I (ref 0–0.1)
CMN PIGWEED IGE QN: <0.1 KUA/I (ref 0–0.1)
COMMON RAGWEED IGE QN: <0.1 KUA/I (ref 0–0.1)
COTTONWOOD IGE QN: <0.1 KUA/I (ref 0–0.1)
D FARINAE IGE QN: <0.1 KUA/I (ref 0–0.1)
D PTERONYSS IGE QN: <0.1 KUA/I (ref 0–0.1)
DOG DANDER IGE QN: <0.1 KUA/I (ref 0–0.1)
LONDON PLANE IGE QN: <0.1 KUA/I (ref 0–0.1)
MOUSE URINE PROT IGE QN: <0.1 KUA/I (ref 0–0.1)
MT JUNIPER IGE QN: <0.1 KUA/I (ref 0–0.1)
MUGWORT IGE QN: <0.1 KUA/I (ref 0–0.1)
P NOTATUM IGE QN: <0.1 KUA/I (ref 0–0.1)
ROACH IGE QN: <0.1 KUA/I (ref 0–0.1)
SHEEP SORREL IGE QN: <0.1 KUA/I (ref 0–0.1)
SILVER BIRCH IGE QN: <0.1 KUA/I (ref 0–0.1)
TIMOTHY IGE QN: <0.1 KUA/I (ref 0–0.1)
TOTAL IGE SMQN RAST: 4.34 KU/L (ref 0–113)
WALNUT IGE QN: <0.1 KUA/I (ref 0–0.1)
WHITE ASH IGE QN: <0.1 KUA/I (ref 0–0.1)
WHITE ELM IGE QN: <0.1 KUA/I (ref 0–0.1)
WHITE MULBERRY IGE QN: <0.1 KUA/I (ref 0–0.1)
WHITE OAK IGE QN: <0.1 KUA/I (ref 0–0.1)

## 2025-05-05 NOTE — PATIENT COMMUNICATION
Pt is scheduled for MBBs with Dr Simon on 5/27/25 and 6/10/25    Pt is not diabetic and reports he does not have a pacemaker or defibrillator    Pt sent full instructions review (multiple recent procedures) via myc message -- discussed PRN pain med hold over phone    Have you completed PT/HEP/Chiro in the past 6 months for dedicated area? HEP given 4/24/25 - pt has also used meds for pain relief  If yes, how long did you complete?  What was the frequency?  Did it provide relief?  If no, reason therapy was not completed?

## 2025-05-05 NOTE — TELEPHONE ENCOUNTER
Caller: Dante    Doctor: Dr. Cruz     Reason for call: can patient increase his Lyrica 50 mg he still has pain 8/10 its the shooting pain R leg lasting longer now, still has trouble sleeping     Call back#: [phone]

## 2025-05-05 NOTE — TELEPHONE ENCOUNTER
Advised pt of same. Aware of side effects to cb. Pt will cb in 1-2 weeks with effectiveness, for new script.  Aware  will be reaching out.

## 2025-05-05 NOTE — TELEPHONE ENCOUNTER
S/w pt. Taking 50mg lyrica bid and 100mg HS. Pt states still having significant pain down the leg during the day and would like to increase further.  Denies any side effects on current dose.    Pt also states he would now like to schedule R cervical MBB discussed at last ov.  Please place order

## 2025-05-05 NOTE — TELEPHONE ENCOUNTER
Patient may increase to 100 mg twice daily if after 10 days the increase is not helping and he is still not having significant side effects we can increase further at that point I would need to represcribe the medication to reflect the dosage change     to call for JORGE

## 2025-05-06 ENCOUNTER — RESULTS FOLLOW-UP (OUTPATIENT)
Age: 85
End: 2025-05-06

## 2025-05-06 NOTE — TELEPHONE ENCOUNTER
S/W pt and advised the same  Went over weaning instructions  Pt would like to see how he does coming off Lyrica  Has OV 5/22, will call sooner for Tramadol if needed but pt leery about going on this type of medication  CB with questions or concerns

## 2025-05-06 NOTE — TELEPHONE ENCOUNTER
----- Message from Severino Hilton MD sent at 5/6/2025  8:45 AM EDT -----  Please let patient know that his labs looked good. Sugar and A1c were normal  
Spoke with patient informed results    
No

## 2025-05-06 NOTE — TELEPHONE ENCOUNTER
Caller: Dante    Doctor: Dr. Cruz    Reason for call:  he thinks Lyrica is flaring up his R knee pain he wants to try something else he wants to get off Lyrica     Call back#: [phone]

## 2025-05-06 NOTE — TELEPHONE ENCOUNTER
The next medication options are opioid medications.     I can prescribe the patient tramadol as needed 50 mg twice daily.  But it is an opioid medication, can cause drowsiness and dizziness, should not operate machinery while using this medication, can develop a dependency on these medications.    Patient should slowly discontinue the medication by removing a capsule every 3 days from his regimen so he should start by taking 50 mg twice a day for 3 days then reduce to 50 mg daily for 3 days and then he may stop

## 2025-05-06 NOTE — TELEPHONE ENCOUNTER
Call from patient and gave him the test results for the allergy testing. He had no questions at this time.

## 2025-05-14 DIAGNOSIS — M48.061 SPINAL STENOSIS OF LUMBAR REGION, UNSPECIFIED WHETHER NEUROGENIC CLAUDICATION PRESENT: ICD-10-CM

## 2025-05-14 DIAGNOSIS — M54.16 LUMBAR RADICULOPATHY: ICD-10-CM

## 2025-05-14 RX ORDER — TRAMADOL HYDROCHLORIDE 50 MG/1
50 TABLET ORAL 2 TIMES DAILY PRN
Qty: 14 TABLET | Refills: 0 | Status: SHIPPED | OUTPATIENT
Start: 2025-05-14 | End: 2025-05-22

## 2025-05-16 ENCOUNTER — OFFICE VISIT (OUTPATIENT)
Dept: OBGYN CLINIC | Facility: CLINIC | Age: 85
End: 2025-05-16
Payer: MEDICARE

## 2025-05-16 VITALS — HEIGHT: 72 IN | BODY MASS INDEX: 26.82 KG/M2 | WEIGHT: 198 LBS

## 2025-05-16 DIAGNOSIS — M17.11 PRIMARY OSTEOARTHRITIS OF RIGHT KNEE: Primary | ICD-10-CM

## 2025-05-16 PROCEDURE — 20610 DRAIN/INJ JOINT/BURSA W/O US: CPT | Performed by: ORTHOPAEDIC SURGERY

## 2025-05-16 PROCEDURE — 99214 OFFICE O/P EST MOD 30 MIN: CPT | Performed by: ORTHOPAEDIC SURGERY

## 2025-05-16 RX ORDER — BUPIVACAINE HYDROCHLORIDE 2.5 MG/ML
2 INJECTION, SOLUTION INFILTRATION; PERINEURAL
Status: COMPLETED | OUTPATIENT
Start: 2025-05-16 | End: 2025-05-16

## 2025-05-16 RX ORDER — LIDOCAINE HYDROCHLORIDE 10 MG/ML
2 INJECTION, SOLUTION INFILTRATION; PERINEURAL
Status: COMPLETED | OUTPATIENT
Start: 2025-05-16 | End: 2025-05-16

## 2025-05-16 RX ORDER — METHYLPREDNISOLONE ACETATE 40 MG/ML
2 INJECTION, SUSPENSION INTRA-ARTICULAR; INTRALESIONAL; INTRAMUSCULAR; SOFT TISSUE
Status: COMPLETED | OUTPATIENT
Start: 2025-05-16 | End: 2025-05-16

## 2025-05-16 RX ADMIN — LIDOCAINE HYDROCHLORIDE 2 ML: 10 INJECTION, SOLUTION INFILTRATION; PERINEURAL at 15:00

## 2025-05-16 RX ADMIN — METHYLPREDNISOLONE ACETATE 2 ML: 40 INJECTION, SUSPENSION INTRA-ARTICULAR; INTRALESIONAL; INTRAMUSCULAR; SOFT TISSUE at 15:00

## 2025-05-16 RX ADMIN — BUPIVACAINE HYDROCHLORIDE 2 ML: 2.5 INJECTION, SOLUTION INFILTRATION; PERINEURAL at 15:00

## 2025-05-16 NOTE — PROGRESS NOTES
Name: Dante Mckinney      : 1940      MRN: [id number]  Encounter Provider: Chris Inman DO  Encounter Date: 2025   Encounter department: [facility] [place]  :  Assessment & Plan  Primary osteoarthritis of right knee    Orders:    Ambulatory Referral to Physical Therapy; Future          Right knee osteoarthritis  X-rays of right knee reviewed and discussed   In depth conversation had with patient regarding nonoperative and surgical management  Continued nonoperative treatment by means of OTC topical and oral analgesics, activity modification, outpatient physical therapy, injection therapy, and bracing.   Surgical management by means of right total knee arthroplasty.  Risks of surgery, including but not limited to, infection, iatrogenic fracture, periprosthetic fracture, aseptic loosening, persistent pain, wound healing complications, gait dysfunction, nerve injury, blood vessel injury, DVT/PE, loss of life or limb, postoperative stiffness, need for subsequent surgery including revision surgery, numbness/tingling in lower extremity and/or over surgical incision, bleeding complications requiring blood transfusion, and anesthesia complications. Increased risk of falling after surgery due to foot drop.  Discussed procedure, anesthesia, overnight hospital stay and working with PT/OT while inpatient, pain management, outpatient PT, return to unrestricted activity  Recommend wearing AFO after surgery.  Patient will require PCP, cardiologist, pulmonologist clearance, labs, CXR, EKG pre operatively  The patient was offered a corticosteroid injection for their right knee. They tolerated the procedure well.  The patient was educated they may have some irritation in the next few days and should rest, ice, elevate and perform gentle range of motion exercises. They were advised the medicine should begin to work in a few days time.    The patient was informed corticosteroid  injections can be repeated at the earliest every 3 months.   He was shown home exercises for knee extension to perform daily  Patient was referred to outpatient physical therapy  Follow up 3 months    History of the Present Illness     History of Present Illness   HPI   Dante Mckinney is a 85 y.o. male with Right knee osteoarthritis. He was last seen in [facility] on 3/3/2025 when he was referred to physical therapy and a medial  brace was ordered. Patient previously tried Toradol, visco, and corticosteroid injections without relief in symptoms. Patient admits his knee is in more pain with the  brace. He has not done any physical therapy. Lives at home with wife and daughter. History of spinal stenosis, treated with Dr. Simon. History of right foot drop for the past 10 years, but does not regularly wear AFO. Uses a walker at home, ambulating with a cane today. History of left total knee arthroplasty in 2009.        Review of Systems     Review of Systems   Constitutional:  Negative for chills and fever.   HENT:  Negative for ear pain and sore throat.    Eyes:  Negative for pain and visual disturbance.   Respiratory:  Negative for cough and shortness of breath.    Cardiovascular:  Negative for chest pain and palpitations.   Gastrointestinal:  Negative for abdominal pain and vomiting.   Genitourinary:  Negative for dysuria and hematuria.   Musculoskeletal:  Negative for arthralgias and back pain.   Skin:  Negative for color change and rash.   Neurological:  Negative for seizures and syncope.   All other systems reviewed and are negative.      Physical Exam     Objective   Ht 6' (1.829 m)   Wt 89.8 kg (198 lb)   BMI 26.85 kg/m²        Right Knee  Range of motion from 12 to 100.    There is no effusion.    There is mild tenderness over the medial joint line.    The patient is able to perform a straight leg raise.    Varus stress testing reveals no instability at 0 and 30 degrees   Valgus stress testing  "reveals no instability at 0 and 30 degrees  The patient is neurovascular intact distally.      Data Review     I have personally reviewed pertinent films in PACS, and my interpretation follows.    X-rays taken 10/08/2024 of Right knee independently reviewed and demonstrate severe medial tibiofemoral compartment joint space narrowing. No acute fracture or dislocation.     Pulmonology notes from 5/2/2025 reviewed.      Lab Results   Component Value Date/Time    HGBA1C 5.4 05/02/2025 11:29 AM       Social History[1]        Large joint arthrocentesis: R knee    Performed by: Chris Inman DO  Authorized by: Chris Inman DO    Universal Protocol:  Consent: Verbal consent obtained  Risks and benefits: risks, benefits and alternatives were discussed  Consent given by: patient  Time out: Immediately prior to procedure a \"time out\" was called to verify the correct patient, procedure, equipment, support staff and site/side marked as required.  Patient understanding: patient states understanding of the procedure being performed  Site marked: the operative site was marked  Patient identity confirmed: verbally with patient  Supporting Documentation  Indications: pain     Is this a Visco injection? NoProcedure Details  Location: knee - R knee  Preparation: Patient was prepped and draped in the usual sterile fashion  Needle size: 22 G  Ultrasound guidance: no  Approach: anterolateral  Medications administered: 2 mL bupivacaine 0.25 %; 2 mL methylPREDNISolone acetate 40 mg/mL; 2 mL lidocaine 1 %    Patient tolerance: patient tolerated the procedure well with no immediate complications  Dressing:  Sterile dressing applied          Nahed Sanders   Scribe Attestation      I,:  Nahed Sanders am acting as a scribe while in the presence of the attending physician.:       I,:  Chris Inman DO personally performed the services described in this documentation    as scribed in my presence.:                  [1]   Social " History  Tobacco Use    Smoking status: Former     Current packs/day: 0.00     Average packs/day: 0.5 packs/day for 34.7 years (17.4 ttl pk-yrs)     Types: Cigarettes, Pipe     Start date: 1960     Quit date: 10/8/1994     Years since quittin.6     Passive exposure: Never    Smokeless tobacco: Never   Vaping Use    Vaping status: Never Used   Substance Use Topics    Alcohol use: Yes     Alcohol/week: 4.0 standard drinks of alcohol     Types: 4 Shots of liquor per week    Drug use: Never

## 2025-05-19 NOTE — PROGRESS NOTES
Pain Medicine Follow-Up Note    Assessment:  1. Chronic pain syndrome    2. Spinal stenosis of lumbar region with neurogenic claudication    3. Lumbar radiculopathy    4. Long-term current use of opiate analgesic        Plan:  Orders Placed This Encounter   Procedures   • MM ALL_Prescribed Meds and Special Instructions     [facility] Is HYDROCODONE/APAP prescribed?:   Yes   • MM DT_Alprazolam Definitive Test   • MM DT_Amphetamine Definitive Test   • MM DT_Buprenorphine Definitive Test   • MM DT_Butalbital Definitive Test   • MM DT_Clonazepam Definitive Test   • MM DT_Cocaine Definitive Test   • MM DT_Codeine Definitive Test   • MM DT_Dextromethorphan Definitive Test   • MM Diazepam Definitive Test   • MM DT_Ethyl Glucuronide/Ethyl Sulfate Definitive Test   • MM DT_Fentanyl Definitive Test   • MM DT_Heroin Definitive Test   • MM DT_Hydrocodone Definitive Test   • MM DT_Hydromorphone Definitive Test   • MM DT_Kratom Definitive Test   • MM DT_Levorphanol Definitive Test   • MM DT_MDMA Definitive Test   • MM DT_Meperidine Definitive Test   • MM DT_Methadone Definitive Test   • MM DT_Methamphetamine Definitive Test   • MM DT_Methylphenidate Definitive Test   • MM DT_Morphine Definitive Test   • MM Lorazepam Definitive Test   • MM DT_Oxazepam Definitive Test   • MM DT_Oxycodone Definitive Test   • MM DT_Oxymorphone Definitive Test   • MM DT_Phencyclidine Definitive Test   • MM DT_Phenobarbital Definitive Test   • MM DT_Phentermine Definitive Test   • MM DT_Secobarbital Definitive Test   • MM DT_Spice Definitive Test   • MM DT_Tapentadol Definitive Test   • MM DT_Temazapam Definitive Test   • MM DT_THC Definitive Test   • MM DT_Tramadol Definitive Test   • MM DT_Validity Creatinine       New Medications Ordered This Visit   Medications   • HYDROcodone-acetaminophen (NORCO) 5-325 mg per tablet     Sig: Take 1 tablet by mouth 2 (two) times a day Max Daily Amount: 2 tablets     Dispense:  14 tablet     Refill:  0       My  impressions and treatment recommendations were discussed in detail with the patient who verbalized understanding and had no further questions.      Patient is also reporting that his neck pain is not nearly as severe as it once was and he wishes to cancel the trigger point injections and cervical epidural steroid injection.    Patient returns to the office regarding his low back pain that radiates down his right posterior leg, patient's pain is primarily at nighttime so he currently has a pain score of 0 out of 10 on the verbal numeric pain scale.  Patient was prescribed pregabalin in the past by our office and did not feel it was effective however he was using 50 mg 3 times daily, patient states that a previous doctor had him on 100 mg 3 times daily, patient reports becoming dizzy and falling backwards.  Patient discontinued the medication and I do not recommend that he reinitiate.  Patient has trialed tramadol 50 mg twice daily, unfortunately this was ineffective and patient reports needing to use about 3000 mg of Tylenol over the course of 10 hours every night.  I recommend the patient initiate hydrocodone/acetaminophen 5/325 mg tablet patient to take 1 tablet up to twice a day as needed for severe pain.  Hydrocodone/acetaminophen 5/325 mg tablet e-prescribed to the patient's pharmacy to fill today 5/22/2025.  Patient to report efficacy in 5 days.    [place] Prescription Drug Monitoring Program report was reviewed and was appropriate     A urine drug screen was collected at today's office visit as part of our medication management protocol. The point of care testing results were appropriate for what was being prescribed. The specimen will be sent for confirmatory testing. The drug screen is medically necessary because the patient is either dependent on opioid medication or is being considered for opioid medication therapy and the results could impact ongoing or future treatment. The drug screen is to  evaluate for the presences or absence of prescribed, non-prescribed, and/or illicit drugs/substances.    There are risks associated with opioid medications, including dependence, addiction and tolerance. The patient understands and agrees to use these medications only as prescribed. Potential side effects of the medications include, but are not limited to, constipation, drowsiness, addiction, impaired judgment and risk of fatal overdose if not taken as prescribed. The patient was warned against driving while taking sedation medications.  Sharing medications is a felony. At this point in time, the patient is showing no signs of addiction, abuse, diversion or suicidal ideation.    An opioid contract was reviewed with the patient.  The patient was made aware they are only to receive opioid medication from our office, and must take the medication as prescribed.  If the medication is lost or stolen, it will not be replaced.  We also do not condone the use of illegal substances or alcohol with opioid medication.  Random urine drug screens and pill counts will also be performed at office visits. Lastly, the patient was informed that office visits are needed for refills.  Patient was agreeable and signed the contract.      Follow-up is planned in 4 weeks time or sooner as warranted.  Discharge instructions were provided. I personally saw and examined the patient and I agree with the above discussed plan of care.    History of Present Illness:    Dante Mckinney is a 85 y.o. male who presents to [facility] for interval re-evaluation of the above stated pain complaints. The patient has a past medical and chronic pain history as outlined in the assessment section. He was last seen on 4/24/2025.    At today's visit patient states that their pain symptoms are the same with a pain score of 0/10 on the verbal numeric pain scale but can shoot up to severe pain at night.  The patient's pain is worse at night.   The patient's pain is constant in nature.  And the quality of the patient's pain is described as sharp and shooting.  The patient's pain is located in the mid low back shooting down his right leg to his foot.  Patient states the amount of pain relief he is obtaining from his current pain relievers which consisted of tramadol and methocarbamol has not provided him any pain relief at this time.    Pain Contract Signed:  5/22/2025  New Urine Drug Screen:  5/22/2025    Other than as stated above, the patient denies any interval changes in medications, medical condition, mental condition, symptoms, or allergies since the last office visit.         Review of Systems:    Review of Systems   Respiratory:  Negative for shortness of breath.    Cardiovascular:  Negative for chest pain.   Gastrointestinal:  Negative for constipation, diarrhea, nausea and vomiting.   Musculoskeletal:  Positive for arthralgias, gait problem and myalgias. Negative for joint swelling.   Skin:  Negative for rash.   Neurological:  Negative for dizziness, seizures and weakness.   All other systems reviewed and are negative.        Past Medical History:   Diagnosis Date   • Arthritis    • BRCA1 positive    • Cancer (HCC)    • Colon cancer (HCC)    • Colorectal cancer (HCC)    • COPD (chronic obstructive pulmonary disease) (HCC)    • Macular degeneration     both eyes   • Melanoma in situ of cheek (HCC)    • Osteoarthritis 2014   • Skin cancer    • Skin tag    • Sleep apnea, obstructive        Past Surgical History:   Procedure Laterality Date   • CARDIAC CATHETERIZATION Left 01/14/2025    Procedure: Cardiac Left Heart Cath;  Surgeon: Tin Lara MD;  Location: [facility];  Service: Cardiology   • CARDIAC CATHETERIZATION N/A 01/14/2025    Procedure: Cardiac Coronary Angiogram;  Surgeon: Tin Lara MD;  Location: [facility];  Service: Cardiology   • CARDIAC CATHETERIZATION  01/14/2025    Procedure: Left Heart Catherization;   Surgeon: Tin Lara MD;  Location: [facility];  Service: Cardiology   • CARDIAC CATHETERIZATION N/A 2025    Procedure: Cardiac FFR/IFR;  Surgeon: Tin Lara MD;  Location: [facility];  Service: Cardiology   • CATARACT EXTRACTION Bilateral     2015   • COLON SURGERY     • COLONOSCOPY     • EYE SURGERY     • JOINT REPLACEMENT     • LAPAROSCOPIC COLON RESECTION     • PROSTATE BIOPSY       and    • PROSTATE SURGERY     • REPLACEMENT TOTAL KNEE Left        • REPLACEMENT TOTAL KNEE Right        • SKIN CANCER EXCISION     • TOTAL HIP ARTHROPLASTY             Family History   Problem Relation Name Age of Onset   • Cancer Mother Lily Mckinney    • Heart disease Father Florencio Mckinney    • Coronary artery disease Father Florencio Mckinney    • Hearing loss Father Florencio Mckinney    • Breast cancer Sister Catia    • Cancer Sister Catia         Breast & Colon   • Colorectal Cancer Maternal Grandmother     • Hodgkin's lymphoma Brother sowmya    • Cancer Brother sowmya         cancer   • Breast cancer Sister Debra Berman    • Colon cancer Sister Debra Berman    • BRCA1 Positive Sister Debra Berman    • Cancer Sister Lilibeth GOODE   • Cancer Sister Lv GOODE   • Osteoporosis Sister Lv GOODE       Social History     Occupational History   • Not on file   Tobacco Use   • Smoking status: Former     Current packs/day: 0.00     Average packs/day: 0.5 packs/day for 34.7 years (17.4 ttl pk-yrs)     Types: Cigarettes, Pipe     Start date: 1960     Quit date: 10/8/1994     Years since quittin.6     Passive exposure: Never   • Smokeless tobacco: Never   Vaping Use   • Vaping status: Never Used   Substance and Sexual Activity   • Alcohol use: Yes     Alcohol/week: 4.0 standard drinks of alcohol     Types: 4 Shots of liquor per week   • Drug use: Never   • Sexual activity: Not Currently     Partners: Female     Birth control/protection: Surgical, Male Sterilization        Current Medications[1]    Allergies[2]    Physical Exam:    Ht 6' (1.829 m)   Wt 89.8 kg (198 lb)   BMI 26.85 kg/m²     Constitutional:normal, well developed, well nourished, alert, in no distress and non-toxic and no overt pain behavior.  Eyes:anicteric  HEENT:grossly intact  Neck:supple, symmetric, trachea midline and no masses   Pulmonary:even and unlabored  Cardiovascular:No edema or pitting edema present  Skin:Normal without rashes or lesions and well hydrated  Psychiatric:Mood and affect appropriate  Neurologic:Cranial Nerves II-XII grossly intact  Musculoskeletal:antalgic and ambulates with cane       Orders Placed This Encounter   Procedures   • MM ALL_Prescribed Meds and Special Instructions   • MM DT_Alprazolam Definitive Test   • MM DT_Amphetamine Definitive Test   • MM DT_Buprenorphine Definitive Test   • MM DT_Butalbital Definitive Test   • MM DT_Clonazepam Definitive Test   • MM DT_Cocaine Definitive Test   • MM DT_Codeine Definitive Test   • MM DT_Dextromethorphan Definitive Test   • MM Diazepam Definitive Test   • MM DT_Ethyl Glucuronide/Ethyl Sulfate Definitive Test   • MM DT_Fentanyl Definitive Test   • MM DT_Heroin Definitive Test   • MM DT_Hydrocodone Definitive Test   • MM DT_Hydromorphone Definitive Test   • MM DT_Kratom Definitive Test   • MM DT_Levorphanol Definitive Test   • MM DT_MDMA Definitive Test   • MM DT_Meperidine Definitive Test   • MM DT_Methadone Definitive Test   • MM DT_Methamphetamine Definitive Test   • MM DT_Methylphenidate Definitive Test   • MM DT_Morphine Definitive Test   • MM Lorazepam Definitive Test   • MM DT_Oxazepam Definitive Test   • MM DT_Oxycodone Definitive Test   • MM DT_Oxymorphone Definitive Test   • MM DT_Phencyclidine Definitive Test   • MM DT_Phenobarbital Definitive Test   • MM DT_Phentermine Definitive Test   • MM DT_Secobarbital Definitive Test   • MM DT_Spice Definitive Test   • MM DT_Tapentadol Definitive Test   • MM DT_Temazapam Definitive  Test   • MM DT_THC Definitive Test   • MM DT_Tramadol Definitive Test   • MM DT_Validity Creatinine       This document was created using speech voice recognition software.   Grammatical errors, random word insertions, pronoun errors, and incomplete sentences are an occasional consequence of this system due to software limitations, ambient noise, and hardware issues.   Any formal questions or concerns about content, text, or information contained within the body of this dictation should be directly addressed to the provider for clarification.           [1]    Current Outpatient Medications:   •  albuterol (Ventolin HFA) 90 mcg/act inhaler, Inhale 2 puffs every 6 (six) hours as needed for wheezing, Disp: 18 g, Rfl: 5  •  amLODIPine (NORVASC) 2.5 mg tablet, Take 2.5 mg by mouth in the morning., Disp: , Rfl:   •  Calcium Carbonate Antacid 400 MG CHEW, Chew, Disp: , Rfl:   •  Cholecalciferol 100 MCG (4000 UT) TABS, Take 2 tablets by mouth in the morning., Disp: , Rfl:   •  fluticasone (FLONASE) 50 mcg/act nasal spray, 2 sprays into each nostril daily, Disp: , Rfl:   •  HYDROcodone-acetaminophen (NORCO) 5-325 mg per tablet, Take 1 tablet by mouth 2 (two) times a day Max Daily Amount: 2 tablets, Disp: 14 tablet, Rfl: 0  •  loratadine (CLARITIN) 10 mg tablet, Take 10 mg by mouth, Disp: , Rfl:   •  losartan (COZAAR) 25 mg tablet, Take 25 mg by mouth, Disp: , Rfl:   •  rosuvastatin (CRESTOR) 10 MG tablet, Take 10 mg by mouth, Disp: , Rfl: [2]  Allergies  Allergen Reactions   • Balance B-50 Dizziness   • Omeprazole Other (See Comments)   • Prednisone Other (See Comments)   • Simvastatin Myalgia

## 2025-05-22 ENCOUNTER — OFFICE VISIT (OUTPATIENT)
Dept: PAIN MEDICINE | Facility: CLINIC | Age: 85
End: 2025-05-22

## 2025-05-22 VITALS — HEIGHT: 72 IN | BODY MASS INDEX: 26.82 KG/M2 | WEIGHT: 198 LBS

## 2025-05-22 DIAGNOSIS — M54.16 LUMBAR RADICULOPATHY: ICD-10-CM

## 2025-05-22 DIAGNOSIS — M48.062 SPINAL STENOSIS OF LUMBAR REGION WITH NEUROGENIC CLAUDICATION: ICD-10-CM

## 2025-05-22 DIAGNOSIS — Z79.891 LONG-TERM CURRENT USE OF OPIATE ANALGESIC: ICD-10-CM

## 2025-05-22 DIAGNOSIS — G89.4 CHRONIC PAIN SYNDROME: Primary | ICD-10-CM

## 2025-05-22 RX ORDER — HYDROCODONE BITARTRATE AND ACETAMINOPHEN 5; 325 MG/1; MG/1
1 TABLET ORAL 2 TIMES DAILY
Qty: 14 TABLET | Refills: 0 | Status: SHIPPED | OUTPATIENT
Start: 2025-05-22

## 2025-05-22 NOTE — PATIENT INSTRUCTIONS

## 2025-05-27 ENCOUNTER — TELEPHONE (OUTPATIENT)
Dept: OBGYN CLINIC | Facility: HOSPITAL | Age: 85
End: 2025-05-27

## 2025-05-27 LAB
6MAM UR QL CFM: NEGATIVE NG/ML
7AMINOCLONAZEPAM UR QL CFM: NEGATIVE NG/ML
A-OH ALPRAZ UR QL CFM: NEGATIVE NG/ML
ACCEPTABLE CREAT UR QL: NORMAL MG/DL
AMPHET UR QL CFM: NEGATIVE NG/ML
AMPHET UR QL CFM: NEGATIVE NG/ML
BUPRENORPHINE UR QL CFM: NEGATIVE NG/ML
BUTALBITAL UR QL CFM: NEGATIVE NG/ML
BZE UR QL CFM: NEGATIVE NG/ML
CODEINE UR QL CFM: NEGATIVE NG/ML
EDDP UR QL CFM: NEGATIVE NG/ML
ETHYL GLUCURONIDE UR QL CFM: NEGATIVE NG/ML
ETHYL SULFATE UR QL SCN: NEGATIVE NG/ML
FENTANYL UR QL CFM: NEGATIVE NG/ML
GLIADIN IGG SER IA-ACNC: NEGATIVE NG/ML
HYDROCODONE UR QL CFM: ABNORMAL NG/ML
HYDROCODONE UR QL CFM: ABNORMAL NG/ML
HYDROMORPHONE UR QL CFM: ABNORMAL NG/ML
LORAZEPAM UR QL CFM: NEGATIVE NG/ML
MDMA UR QL CFM: NEGATIVE NG/ML
ME-PHENIDATE UR QL CFM: NEGATIVE NG/ML
MEPERIDINE UR QL CFM: NEGATIVE NG/ML
METHADONE UR QL CFM: NEGATIVE NG/ML
METHAMPHET UR QL CFM: NEGATIVE NG/ML
MORPHINE UR QL CFM: NEGATIVE NG/ML
MORPHINE UR QL CFM: NEGATIVE NG/ML
NORBUPRENORPHINE UR QL CFM: NEGATIVE NG/ML
NORDIAZEPAM UR QL CFM: NEGATIVE NG/ML
NORFENTANYL UR QL CFM: NEGATIVE NG/ML
NORHYDROCODONE UR QL CFM: ABNORMAL NG/ML
NORHYDROCODONE UR QL CFM: ABNORMAL NG/ML
NORMEPERIDINE UR QL CFM: NEGATIVE NG/ML
NOROXYCODONE UR QL CFM: NEGATIVE NG/ML
OXAZEPAM UR QL CFM: NEGATIVE NG/ML
OXYCODONE UR QL CFM: NEGATIVE NG/ML
OXYMORPHONE UR QL CFM: ABNORMAL NG/ML
OXYMORPHONE UR QL CFM: ABNORMAL NG/ML
PARA-FLUOROFENTANYL QUANTIFICATION: NORMAL NG/ML
PCP UR QL CFM: NEGATIVE NG/ML
PHENOBARB UR QL CFM: NEGATIVE NG/ML
RESULT ALL_PRESCRIBED MEDS AND SPECIAL INSTRUCTIONS: NORMAL
SECOBARBITAL UR QL CFM: NEGATIVE NG/ML
SL AMB 5F-ADB-M7 METABOLITE QUANTIFICATION: NEGATIVE NG/ML
SL AMB 7-OH-MITRAGYNINE (KRATOM ALKALOID) QUANTIFICATION: NEGATIVE NG/ML
SL AMB AB-FUBINACA-M3 METABOLITE QUANTIFICATION: NEGATIVE NG/ML
SL AMB ACETYL FENTANYL QUANTIFICATION: NORMAL NG/ML
SL AMB ACETYL NORFENTANYL QUANTIFICATION: NORMAL NG/ML
SL AMB ACRYL FENTANYL QUANTIFICATION: NORMAL NG/ML
SL AMB CARFENTANIL QUANTIFICATION: NORMAL NG/ML
SL AMB CTHC (MARIJUANA METABOLITE) QUANTIFICATION: NEGATIVE NG/ML
SL AMB DEXTROMETHORPHAN QUANTIFICATION: NEGATIVE NG/ML
SL AMB DEXTRORPHAN (DEXTROMETHORPHAN METABOLITE) QUANT: NEGATIVE NG/ML
SL AMB DEXTRORPHAN (DEXTROMETHORPHAN METABOLITE) QUANT: NEGATIVE NG/ML
SL AMB JWH018 METABOLITE QUANTIFICATION: NEGATIVE NG/ML
SL AMB JWH073 METABOLITE QUANTIFICATION: NEGATIVE NG/ML
SL AMB MDMB-FUBINACA-M1 METABOLITE QUANTIFICATION: NEGATIVE NG/ML
SL AMB N-DESMETHYL-TRAMADOL QUANTIFICATION: ABNORMAL NG/ML
SL AMB PHENTERMINE QUANTIFICATION: NEGATIVE NG/ML
SL AMB RCS4 METABOLITE QUANTIFICATION: NEGATIVE NG/ML
SL AMB RITALINIC ACID QUANTIFICATION: NEGATIVE NG/ML
SPECIMEN DRAWN SERPL: NEGATIVE NG/ML
TAPENTADOL UR QL CFM: NEGATIVE NG/ML
TEMAZEPAM UR QL CFM: NEGATIVE NG/ML
TEMAZEPAM UR QL CFM: NEGATIVE NG/ML
TRAMADOL UR QL CFM: ABNORMAL NG/ML
URATE/CREAT 24H UR: ABNORMAL NG/ML

## 2025-05-28 ENCOUNTER — TELEPHONE (OUTPATIENT)
Dept: RADIOLOGY | Facility: CLINIC | Age: 85
End: 2025-05-28

## 2025-05-28 NOTE — TELEPHONE ENCOUNTER
"S/W pt who states he had some \"Old stuff\" that he was using.  Advised that this is dangerous to mix Opioids and he should not do this as it can cause respiratory depression, myla in a person his age.  Pt states he will throw it out.  Advised I would let MG know and CB if anything changes with him taking/getting Percocet  "

## 2025-05-28 NOTE — TELEPHONE ENCOUNTER
OSBALDO Mosher to [facility] (Selected Message)  5/27/25  7:57 PM  Patiet has not been prescribed oxcodone at time of urine collection, but tested positive for oxycodone. Please investigate.     Thank you

## 2025-06-03 ENCOUNTER — CONSULT (OUTPATIENT)
Dept: CARDIAC SURGERY | Facility: CLINIC | Age: 85
End: 2025-06-03
Payer: MEDICARE

## 2025-06-03 VITALS
BODY MASS INDEX: 26.82 KG/M2 | WEIGHT: 198 LBS | SYSTOLIC BLOOD PRESSURE: 154 MMHG | OXYGEN SATURATION: 93 % | HEIGHT: 72 IN | HEART RATE: 76 BPM | DIASTOLIC BLOOD PRESSURE: 58 MMHG

## 2025-06-03 DIAGNOSIS — J43.9 LUNG BULLAE (HCC): Primary | ICD-10-CM

## 2025-06-03 DIAGNOSIS — R91.8 LUNG NODULES: ICD-10-CM

## 2025-06-03 DIAGNOSIS — I10 PRIMARY HYPERTENSION: ICD-10-CM

## 2025-06-03 PROCEDURE — 99204 OFFICE O/P NEW MOD 45 MIN: CPT | Performed by: THORACIC SURGERY (CARDIOTHORACIC VASCULAR SURGERY)

## 2025-06-03 NOTE — PROGRESS NOTES
Name: Dante Mckinney      : 1940      MRN: [id number]  Encounter Provider: Yancy Graves MD  Encounter Date: 6/3/2025   Encounter department: [facility]  :  Assessment & Plan  Lung bullae (HCC)  Today in the office, we had a conversation regarding the large right lower lobe lung bulla.  I did explain to him that at this time, I would just continue to watch this.  I would not resect this bulla as he would likely have significant air leaks with any type of surgical resection.  We did discuss strict ED precautions and for any worsening shortness of breath or acute chest pain, he should report to the emergency department.  I did explain to him that if his lung were to collapse, the emergency department would place a chest tube and then he should request that thoracic surgery be called since he is established with us.  He understands and he agrees with the plan.  He can follow-up with me on an as-needed basis.       Lung nodules  He has multiple lung nodules.  These appear stable and are likely granulomas.         Primary hypertension  Continue medication as prescribed.  Being managed by his PCP.             Thoracic History     No problems updated.     History of Present Illness   HPI  Dante Mckinney is a 85 y.o. male who presents my office to discuss a large right sided lower lobe bulla.  I have personally reviewed his CT scan in PACS.  He has a large, approximately 12 cm bulla in the right lower lung.  He also has a few scattered granulomas.    Today in the office, the patient does report that he does not get significantly short of breath at baseline but he does have dyspnea on exertion.  He denies any chest pain.  He reports that he has a chronic cough and does experience bronchitis from time to time.  He denies any recent upper respiratory infection or pneumonia.    I have personally reviewed the most relevant notes in the chart from Dr. Long    Review of Systems    Medical History Reviewed by provider this encounter:     .  Past Medical History   Past Medical History[1]  Past Surgical History[2]  Family History[3]   reports that he quit smoking about 30 years ago. His smoking use included cigarettes and pipe. He started smoking about 65 years ago. He has a 17.4 pack-year smoking history. He has never been exposed to tobacco smoke. He has never used smokeless tobacco. He reports current alcohol use of about 4.0 standard drinks of alcohol per week. He reports that he does not use drugs.  Current Outpatient Medications   Medication Instructions    albuterol (Ventolin HFA) 90 mcg/act inhaler 2 puffs, Inhalation, Every 6 hours PRN    amLODIPine (NORVASC) 2.5 mg, Daily    Calcium Carbonate Antacid 400 MG CHEW Chew    Cholecalciferol 100 MCG (4000 UT) TABS 2 tablets, Daily    doxycycline hyclate (VIBRAMYCIN) 100 mg, Oral, Every 12 hours scheduled    famotidine (PEPCID) 40 mg, Oral, Daily at bedtime PRN    fluticasone (FLONASE) 50 mcg/act nasal spray 2 sprays, Daily    HYDROcodone-acetaminophen (NORCO) 5-325 mg per tablet 1 tablet, Oral, 2 times daily    loratadine (CLARITIN) 10 mg    losartan (COZAAR) 25 mg    oxyCODONE-acetaminophen (PERCOCET) 5-325 mg per tablet 1 tablet, Oral, 2 times daily PRN    rosuvastatin (CRESTOR) 10 mg   Allergies[4]   Medications Ordered Prior to Encounter[5]   Social History[6]     Objective   /58 (BP Location: Left arm, Patient Position: Sitting)   Pulse 76   Ht 6' (1.829 m)   Wt 89.8 kg (198 lb)   SpO2 93%   BMI 26.85 kg/m²     Pain Screening:     ECOG    Physical Exam    Labs:   Results for orders placed or performed in visit on 05/22/25   MM ALL_Prescribed Meds and Special Instructions   Result Value Ref Range    RESULT ALL_PRESC MEDS SP INSTRNS Not Applicable    MM DT_Alprazolam Definitive Test   Result Value Ref Range    Alpha-Hydroxyalprazolam Quantification UR negative 20 ng/mL   MM DT_Amphetamine Definitive Test   Result Value Ref  Range    Amphetamine Quantification negative 100 ng/mL   MM DT_Buprenorphine Definitive Test   Result Value Ref Range    Buprenorphine Quantification negative 5 ng/mL    Norbuprenorphine Quantification negative 20 ng/mL   MM DT_Butalbital Definitive Test   Result Value Ref Range    Butalbital Quantification negative 200 ng/mL   MM DT_Clonazepam Definitive Test   Result Value Ref Range    7-Amino-Clonazepam Quantification UR negative 20 ng/mL   MM DT_Cocaine Definitive Test   Result Value Ref Range    Cocaine metabolite Quantification negative 50 ng/mL   MM DT_Codeine Definitive Test   Result Value Ref Range    Codeine Quantification negative 50 ng/mL    Morphine Quantification negative 50 ng/mL    Hydrocodone Quantification negative-I (A) 50 ng/mL    Norhydrocodone Quantification negative-I (A) 50 ng/mL    Hydromorphone Quantification negative-I (A) 50 ng/mL   MM DT_Dextromethorphan Definitive Test   Result Value Ref Range    Dextromethorphan Quantification negative 50 ng/mL    Dextrorphan (Dextromethorphan metabolite) Quant negative 50 ng/mL   MM Diazepam Definitive Test   Result Value Ref Range    Nordiazepam Quantification negative 40 ng/mL    Temazepam Quantification negative 50 ng/mL    Oxazepam Quantification negative 40 ng/mL   MM DT_Ethyl Glucuronide/Ethyl Sulfate Definitive Test   Result Value Ref Range    Ethyl Glucuronide Quantification negative 500 ng/mL    Ethyl Sulfate Quantification negative 500 ng/mL   MM DT_Fentanyl Definitive Test   Result Value Ref Range    Fentanyl Quantification negative 1 ng/mL    Norfentanyl Quantification negative 8 ng/mL    Acetyl fentanyl Quantification Fen Neg 2 ng/mL    Acetyl norfentanyl Quantification Fen Neg 5 ng/mL    Acryl fentanyl Quantification Fen Neg 1 ng/mL    Carfentanil Quantification Fen Neg 2 ng/mL    Para-fluorofentanyl Quantification Fen Neg 1 ng/mL   MM DT_Heroin Definitive Test   Result Value Ref Range    6-VANNESA (Heroin metabolite) Quantification UR  negative 10 ng/mL   MM DT_Hydrocodone Definitive Test   Result Value Ref Range    Hydrocodone Quantification negative-I (A) 50 ng/mL    Norhydrocodone Quantification negative-I (A) 50 ng/mL    Hydromorphone Quantification negative-I (A) 50 ng/mL   MM DT_Hydromorphone Definitive Test   Result Value Ref Range    Hydromorphone Quantification negative-I (A) 50 ng/mL   MM DT_Kratom Definitive Test   Result Value Ref Range    Mitragynine (Kratom alkaloid) Quantification negative 1 ng/mL    1-FI-Kgeznxlxwfy (Kratom alkaloid) Quantification negative 1 ng/mL   MM DT_Levorphanol Definitive Test   Result Value Ref Range    Dextrorphan (Dextromethorphan metabolite) Quant negative 50 ng/mL   MM DT_MDMA Definitive Test   Result Value Ref Range    MDMA Quantification negative 100 ng/mL   MM DT_Meperidine Definitive Test   Result Value Ref Range    Meperidine Quantification negative 50 ng/mL    Normeperidine Quantification negative 50 ng/mL   MM DT_Methadone Definitive Test   Result Value Ref Range    Methadone Quantification negative 100 ng/mL    EDDP (Methadone metabolite) Quantification negative 100 ng/mL   MM DT_Methamphetamine Definitive Test   Result Value Ref Range    Amphetamine Quantification negative 100 ng/mL    Methamphetamine Quantification negative 100 ng/mL   MM DT_Methylphenidate Definitive Test   Result Value Ref Range    Methylphenidate Quantification negative 50 ng/mL    RITALINIC ACID QUANTIFICATION negative 50 ng/mL   MM DT_Morphine Definitive Test   Result Value Ref Range    Morphine Quantification negative 50 ng/mL    Hydromorphone Quantification negative-I (A) 50 ng/mL   MM Lorazepam Definitive Test   Result Value Ref Range    Lorazepam Quantification negative 40 ng/mL   MM DT_Oxazepam Definitive Test   Result Value Ref Range    Oxazepam Quantification negative 40 ng/mL   MM DT_Oxycodone Definitive Test   Result Value Ref Range    Oxycodone Quantification negative 50 ng/mL    Noroxycodone Quantification  negative 50 ng/mL    Oxymorphone Quantification positive-99.097-I (A) 50 ng/mL   MM DT_Oxymorphone Definitive Test   Result Value Ref Range    Oxymorphone Quantification positive-99.097-I (A) 50 ng/mL   MM DT_Phencyclidine Definitive Test   Result Value Ref Range    Phencyclidine Quantification negative 10 ng/mL   MM DT_Phenobarbital Definitive Test   Result Value Ref Range    Phenobarbital Quantification negative 200 ng/mL   MM DT_Phentermine Definitive Test   Result Value Ref Range    PHENTERMINE QUANTIFICATION negative 50 ng/mL   MM DT_Secobarbital Definitive Test   Result Value Ref Range    Secobarbital Quantification negative 200 ng/mL   MM DT_Spice Definitive Test   Result Value Ref Range    5F-ADB-M7 negative 10 ng/mL    PT-YNLUHCGO-A7 METABOLITE QUANTIFICATION  negative 10 ng/mL    SWVN-NMDIYJZI-M3 METABOLITE QUANTIFICATION negative 10 ng/mL    OUH545 metabolite Quantification negative 10 ng/mL    OVT825 metabolite Quantification negative 10 ng/mL    RCS4 METABOLITE QUANTIFICATION negative 10 ng/mL    XLR11/ METABOLITE QUANTIFICATION negative 10 ng/mL   MM DT_Tapentadol Definitive Test   Result Value Ref Range    Tapentadol Quantification negative 50 ng/mL   MM DT_Temazapam Definitive Test   Result Value Ref Range    Temazepam Quantification negative 50 ng/mL    Oxazepam Quantification negative 40 ng/mL   MM DT_THC Definitive Test   Result Value Ref Range    cTHC (Marijuana metabolite) Quantification negative 15 ng/mL   MM DT_Tramadol Definitive Test   Result Value Ref Range    Tramadol Quantification positive-3560.557-I (A) 100 ng/mL    O-desmethyl-tramadol Quantification positive-9788.131-I (A) 100 ng/mL    N-DESMETHYL-TRAMADOL QUANTIFICATION positive-1192.474-I (A) 100 ng/mL   MM DT_Validity Creatinine   Result Value Ref Range    CREATININE normal-72.1 >20 mg/dL mg/dL        Radiology Results Review : I have reviewed images/report studies in PACS as described above (in the HPI).  Pathology: I have  reviewed pathology reports described above.           [1]   Past Medical History:  Diagnosis Date    Arthritis     BRCA1 positive     Cancer (HCC)     Colon cancer (HCC)     Colorectal cancer (HCC)     COPD (chronic obstructive pulmonary disease) (HCC)     Ear problems     GERD (gastroesophageal reflux disease)     HL (hearing loss)     Macular degeneration     both eyes    Melanoma in situ of cheek (HCC)     Nasal congestion     especially in mornings    Osteoarthritis 2014    PND (post-nasal drip)     Skin cancer     Skin tag     Sleep apnea, obstructive     Tinnitus    [2]   Past Surgical History:  Procedure Laterality Date    CARDIAC CATHETERIZATION Left 01/14/2025    Procedure: Cardiac Left Heart Cath;  Surgeon: Tin Lara MD;  Location: [facility];  Service: Cardiology    CARDIAC CATHETERIZATION N/A 01/14/2025    Procedure: Cardiac Coronary Angiogram;  Surgeon: Tin Lara MD;  Location: [facility];  Service: Cardiology    CARDIAC CATHETERIZATION  01/14/2025    Procedure: Left Heart Catherization;  Surgeon: Tin Lara MD;  Location: [facility];  Service: Cardiology    CARDIAC CATHETERIZATION N/A 01/14/2025    Procedure: Cardiac FFR/IFR;  Surgeon: Tin Lara MD;  Location: [facility];  Service: Cardiology    CATARACT EXTRACTION Bilateral     2015    COLON SURGERY  2019    COLONOSCOPY      EYE SURGERY      JOINT REPLACEMENT  2009    LAPAROSCOPIC COLON RESECTION      PROSTATE BIOPSY      2009 and 1994    PROSTATE SURGERY  2024    REPLACEMENT TOTAL KNEE Left     2009    REPLACEMENT TOTAL KNEE Right     2016    SKIN CANCER EXCISION      TOTAL HIP ARTHROPLASTY      2021   [3]   Family History  Problem Relation Name Age of Onset    Cancer Mother Lily Mckinneyshekhar     Heart disease Father Florencio Mckinney     Coronary artery disease Father Florencio Mckinney     Hearing loss Father Florencio Mckinney     Breast cancer Sister Catia     Cancer Sister Catia         Breast & Colon    Colorectal Cancer  Maternal Grandmother      Hodgkin's lymphoma Brother sowmya     Cancer Brother sowmya         cancer    Breast cancer Sister Debra Berman     Colon cancer Sister Debra Berman     BRCA1 Positive Sister Debra Berman     Cancer Sister Bre         MS    Cancer Sister Lv GOODE    Osteoporosis Sister Lv         MS   [4]   Allergies  Allergen Reactions    Balance B-50 Dizziness    Omeprazole Other (See Comments)    Prednisone Other (See Comments)    Simvastatin Myalgia   [5]   Current Outpatient Medications on File Prior to Visit   Medication Sig Dispense Refill    amLODIPine (NORVASC) 2.5 mg tablet Take 2.5 mg by mouth in the morning.      Calcium Carbonate Antacid 400 MG CHEW Chew      Cholecalciferol 100 MCG (4000 UT) TABS Take 2 tablets by mouth in the morning.      doxycycline hyclate (VIBRAMYCIN) 100 mg capsule Take 1 capsule (100 mg total) by mouth every 12 (twelve) hours for 21 days 42 capsule 0    famotidine (PEPCID) 40 MG tablet Take 1 tablet (40 mg total) by mouth daily at bedtime as needed for heartburn 30 tablet 11    fluticasone (FLONASE) 50 mcg/act nasal spray 2 sprays into each nostril daily      loratadine (CLARITIN) 10 mg tablet Take 10 mg by mouth      losartan (COZAAR) 25 mg tablet Take 25 mg by mouth      oxyCODONE-acetaminophen (PERCOCET) 5-325 mg per tablet Take 1 tablet by mouth 2 (two) times a day as needed for moderate pain Max Daily Amount: 2 tablets 14 tablet 0    rosuvastatin (CRESTOR) 10 MG tablet Take 10 mg by mouth      albuterol (Ventolin HFA) 90 mcg/act inhaler Inhale 2 puffs every 6 (six) hours as needed for wheezing 18 g 5    HYDROcodone-acetaminophen (NORCO) 5-325 mg per tablet Take 1 tablet by mouth 2 (two) times a day Max Daily Amount: 2 tablets 14 tablet 0     No current facility-administered medications on file prior to visit.   [6]   Social History  Tobacco Use    Smoking status: Former     Current packs/day: 0.00     Average packs/day: 0.5 packs/day for 34.7 years (17.4  ttl pk-yrs)     Types: Cigarettes, Pipe     Start date: 1960     Quit date: 10/8/1994     Years since quittin.6     Passive exposure: Never    Smokeless tobacco: Never   Vaping Use    Vaping status: Never Used   Substance and Sexual Activity    Alcohol use: Yes     Alcohol/week: 4.0 standard drinks of alcohol     Types: 4 Shots of liquor per week    Drug use: Never    Sexual activity: Not Currently     Partners: Female     Birth control/protection: Surgical, Male Sterilization

## 2025-06-03 NOTE — ASSESSMENT & PLAN NOTE
Today in the office, we had a conversation regarding the large right lower lobe lung bulla.  I did explain to him that at this time, I would just continue to watch this.  I would not resect this bulla as he would likely have significant air leaks with any type of surgical resection.  We did discuss strict ED precautions and for any worsening shortness of breath or acute chest pain, he should report to the emergency department.  I did explain to him that if his lung were to collapse, the emergency department would place a chest tube and then he should request that thoracic surgery be called since he is established with us.  He understands and he agrees with the plan.  He can follow-up with me on an as-needed basis.

## 2025-06-04 ENCOUNTER — EVALUATION (OUTPATIENT)
Dept: PHYSICAL THERAPY | Facility: CLINIC | Age: 85
End: 2025-06-04
Attending: ORTHOPAEDIC SURGERY
Payer: MEDICARE

## 2025-06-04 DIAGNOSIS — M54.16 LUMBAR RADICULOPATHY: ICD-10-CM

## 2025-06-04 DIAGNOSIS — M48.061 SPINAL STENOSIS OF LUMBAR REGION, UNSPECIFIED WHETHER NEUROGENIC CLAUDICATION PRESENT: ICD-10-CM

## 2025-06-04 DIAGNOSIS — M17.11 PRIMARY OSTEOARTHRITIS OF RIGHT KNEE: Primary | ICD-10-CM

## 2025-06-04 PROCEDURE — 97161 PT EVAL LOW COMPLEX 20 MIN: CPT | Performed by: PHYSICAL THERAPIST

## 2025-06-04 PROCEDURE — 97110 THERAPEUTIC EXERCISES: CPT | Performed by: PHYSICAL THERAPIST

## 2025-06-04 RX ORDER — OXYCODONE AND ACETAMINOPHEN 5; 325 MG/1; MG/1
1 TABLET ORAL 2 TIMES DAILY PRN
Qty: 14 TABLET | Refills: 0 | OUTPATIENT
Start: 2025-06-04

## 2025-06-04 NOTE — TELEPHONE ENCOUNTER
LVM for [facility] pharmacy stating Rx refill request for oxycodone was not Rx'd by Dr. Hilton or SALEEM CANNON

## 2025-06-04 NOTE — PROGRESS NOTES
PT Evaluation     Today's date: 2025  Patient name: Dante Mckinney  : 1940  MRN: [id number]  Referring provider: Chris Inman DO  Dx:   Encounter Diagnosis     ICD-10-CM    1. Primary osteoarthritis of right knee  M17.11                      Assessment  Impairments: abnormal or restricted ROM, impaired physical strength, lacks appropriate home exercise program and pain with function    Assessment details: Dante Mckinney is a 85 y.o. male who presents with pain, decreased strength, and decreased ROM.  Due to these impairments, patient has difficulty performing a/iadls and recreational activities. Patient's clinical presentation is consistent with their referring diagnosis of primary osteoarthritis of right knee.  Patient states he wishes to attempt exercises independently at home at this time.  Patient was instructed in HEP and proper technique for completion of activities.  PT will be placed on hold at this time and patient was instructed to contact our office if he feels he requires further treatment sessions.     Plan    Planned therapy interventions: home exercise program    Plan details: PT on hold at this time        Subjective Evaluation    History of Present Illness  Mechanism of injury: Patient refers to PT with c/o pain in his right knee which has been present for multiple years.  X-rays of the right knee taken in 10/2024 revealed advanced degenerative changes of the knee, most prominent in the medial compartment as evidenced by marked joint space narrowing and marginal spurring.  Patient c/o pain with ambulation and squatting and bending activities.  Patient denies instability or locking of his right knee.  Patient currently ambulates with a cane or rolling walker at all times.    Pain  Current pain ratin  At best pain ratin  At worst pain ratin        Objective     Active Range of Motion     Right Knee   Flexion: 115 degrees   Extension: -15 degrees     Passive Range of Motion      Right Knee   Flexion: 122 degrees   Extension: -10 degrees     Strength/Myotome Testing     Right Hip   Planes of Motion   Flexion: 4  Extension: 4-  Abduction: 4  Adduction: 4-    Left Knee   Flexion: 5  Extension: 5    Right Knee   Flexion: 4+  Extension: 4+    Left Ankle/Foot   Dorsiflexion: 5  Plantar flexion: 5    Right Ankle/Foot   Dorsiflexion: 3-  Plantar flexion: 4             Precautions: Left TKA - 2009, Right LAURI - 2021, Drop foot Right LE, Hx of colon CA (colon resection - 2019)  POC expires Unit limit Auth Expiration date PT/OT/ST + Visit Limit?   N/A N/A N/A BOMN                           Visit/Unit Tracking  AUTH Status:  Date                Used                Remaining                     Manuals 6/4                                                                Neuro Re-Ed                                                                                                        Ther Ex             Quad sets with towel @ ankle HEP            SLR flex HEP            Sitting knee ext prop w/wt. HEP            Sitting knee ext PROM HEP                                                                Ther Activity                                       Gait Training                                       Modalities

## 2025-06-06 ENCOUNTER — OFFICE VISIT (OUTPATIENT)
Dept: PODIATRY | Facility: CLINIC | Age: 85
End: 2025-06-06
Payer: MEDICARE

## 2025-06-06 VITALS — WEIGHT: 198 LBS | BODY MASS INDEX: 26.82 KG/M2 | HEIGHT: 72 IN

## 2025-06-06 DIAGNOSIS — M79.675 PAIN IN TOES OF BOTH FEET: ICD-10-CM

## 2025-06-06 DIAGNOSIS — M79.674 PAIN IN TOES OF BOTH FEET: ICD-10-CM

## 2025-06-06 DIAGNOSIS — B35.1 ONYCHOMYCOSIS: Primary | ICD-10-CM

## 2025-06-06 DIAGNOSIS — I73.9 PERIPHERAL VASCULAR DISEASE (HCC): ICD-10-CM

## 2025-06-06 DIAGNOSIS — G60.9 IDIOPATHIC NEUROPATHY: ICD-10-CM

## 2025-06-06 PROCEDURE — RECHECK: Performed by: PODIATRIST

## 2025-06-06 PROCEDURE — 11721 DEBRIDE NAIL 6 OR MORE: CPT | Performed by: PODIATRIST

## 2025-06-06 NOTE — PROGRESS NOTES
Name: Dante Mckinney      : 1940      MRN: [id number]  Encounter Provider: Ernst Anton DPM  Encounter Date: 2025   Encounter department: [facility] [place]  :  Assessment & Plan  Onychomycosis       Debride mycotic nails and thin the nail plates x 10 with the use of a nail nipper manually and an electric Dremel bur was used to reduce the thickness of the nail beds and smoothed the distal aspect of the nails.   Idiopathic neuropathy         Peripheral vascular disease (HCC)         Pain in toes of both feet         Pt was instructed to use lotion once a day on both feet such as cerave, Cetaphil or similar thick style of lotion.   Discussed proper shoe gear, daily inspections of feet, and general foot health with patient. Patient has Q8  findings and is recommended for at risk foot care every 9-10 weeks.    Patients most recent complete clinical foot exam was on: 2025    Return in about 5 months (around 2025).       History of Present Illness   HPI  Dante Mckinney is a 85 y.o. male who presents with chief complaint of painful thick nails on both feet.  He has idiopathic peripheral neuropathy.Patient presents for at-risk foot care.  Patient has no acute concerns today.  Patient has significant lower extremity risk due to neuropathy, parasthesia, edema, and trophic skin changes to the lower extremity.   History obtained from: patient    Review of Systems  Medical History Reviewed by provider this encounter:     .  Medications Ordered Prior to Encounter[1]   Social History[2]     Objective   Ht 6' (1.829 m)   Wt 89.8 kg (198 lb)   BMI 26.85 kg/m²      Physical Exam  Vascular status is a faint 1/4 DP PT negative digital hair, normal distal cooling, and delayed capillary refill bilaterally.  Capillary refill is approximately 3 to 4 seconds.  There is pitting edema present bilaterally of +2.     Derm nails are brittle elongated hypertrophic yellow-brown discoloration with subungual  debris x 10.  There is an increased thickness in the nails of approximately 2 to 3 mm.  There is loss of turgor and thinning of the skin noted bilaterally.  There is a small amount of dry flaky skin present on both feet secondary to his chronic edema.       [1]   Current Outpatient Medications on File Prior to Visit   Medication Sig Dispense Refill    amLODIPine (NORVASC) 2.5 mg tablet Take 2.5 mg by mouth in the morning.      Calcium Carbonate Antacid 400 MG CHEW Chew      Cholecalciferol 100 MCG (4000 UT) TABS Take 2 tablets by mouth in the morning.      doxycycline hyclate (VIBRAMYCIN) 100 mg capsule Take 1 capsule (100 mg total) by mouth every 12 (twelve) hours for 21 days 42 capsule 0    famotidine (PEPCID) 40 MG tablet Take 1 tablet (40 mg total) by mouth daily at bedtime as needed for heartburn 30 tablet 11    fluticasone (FLONASE) 50 mcg/act nasal spray 2 sprays into each nostril daily      loratadine (CLARITIN) 10 mg tablet Take 10 mg by mouth      losartan (COZAAR) 25 mg tablet Take 25 mg by mouth      oxyCODONE-acetaminophen (PERCOCET) 5-325 mg per tablet Take 1 tablet by mouth 2 (two) times a day as needed for moderate pain Max Daily Amount: 2 tablets 14 tablet 0    rosuvastatin (CRESTOR) 10 MG tablet Take 10 mg by mouth      albuterol (Ventolin HFA) 90 mcg/act inhaler Inhale 2 puffs every 6 (six) hours as needed for wheezing 18 g 5    HYDROcodone-acetaminophen (NORCO) 5-325 mg per tablet Take 1 tablet by mouth 2 (two) times a day Max Daily Amount: 2 tablets 14 tablet 0     No current facility-administered medications on file prior to visit.   [2]   Social History  Tobacco Use    Smoking status: Former     Current packs/day: 0.00     Average packs/day: 0.5 packs/day for 34.7 years (17.4 ttl pk-yrs)     Types: Cigarettes, Pipe     Start date: 1960     Quit date: 10/8/1994     Years since quittin.6     Passive exposure: Never    Smokeless tobacco: Never   Vaping Use    Vaping status: Never Used    Substance and Sexual Activity    Alcohol use: Yes     Alcohol/week: 4.0 standard drinks of alcohol     Types: 4 Shots of liquor per week    Drug use: Never    Sexual activity: Not Currently     Partners: Female     Birth control/protection: Surgical, Male Sterilization

## 2025-06-19 ENCOUNTER — OFFICE VISIT (OUTPATIENT)
Dept: PAIN MEDICINE | Facility: CLINIC | Age: 85
End: 2025-06-19

## 2025-06-19 VITALS — WEIGHT: 198 LBS | BODY MASS INDEX: 26.82 KG/M2 | HEIGHT: 72 IN

## 2025-06-19 DIAGNOSIS — M48.061 SPINAL STENOSIS OF LUMBAR REGION, UNSPECIFIED WHETHER NEUROGENIC CLAUDICATION PRESENT: ICD-10-CM

## 2025-06-19 DIAGNOSIS — Z79.891 LONG-TERM CURRENT USE OF OPIATE ANALGESIC: ICD-10-CM

## 2025-06-19 DIAGNOSIS — G89.4 CHRONIC PAIN SYNDROME: Primary | ICD-10-CM

## 2025-06-19 DIAGNOSIS — M54.16 LUMBAR RADICULOPATHY: ICD-10-CM

## 2025-06-19 RX ORDER — OXYCODONE AND ACETAMINOPHEN 5; 325 MG/1; MG/1
1 TABLET ORAL 2 TIMES DAILY PRN
Qty: 60 TABLET | Refills: 0 | Status: SHIPPED | OUTPATIENT
Start: 2025-08-09

## 2025-06-19 RX ORDER — OXYCODONE AND ACETAMINOPHEN 5; 325 MG/1; MG/1
1 TABLET ORAL 2 TIMES DAILY PRN
Qty: 60 TABLET | Refills: 0 | Status: SHIPPED | OUTPATIENT
Start: 2025-07-10

## 2025-06-19 NOTE — PROGRESS NOTES
Name: Dante Mckinney      : 1940      MRN: [id number]  Encounter Provider: OSBALDO Mosher  Encounter Date: 2025   Encounter department: [facility]  :  Assessment & Plan  Chronic pain syndrome  Spinal stenosis of lumbar region, unspecified whether neurogenic claudication present  Lumbar radiculopathy  Long-term current use of opiate analgesic    Orders:  •  oxyCODONE-acetaminophen (PERCOCET) 5-325 mg per tablet; Take 1 tablet by mouth 2 (two) times a day as needed for moderate pain Max Daily Amount: 2 tablets Do not start before July 10, 2025.  •  oxyCODONE-acetaminophen (PERCOCET) 5-325 mg per tablet; Take 1 tablet by mouth 2 (two) times a day as needed for moderate pain Max Daily Amount: 2 tablets Do not start before 2025.      The patient continues to report an overall reduction of his pain level and improvement with his functioning without significant side effects using oxycodone/acetaminophen 5/325 mg tablet patient takes 1 tablet up to twice a day as needed for moderate pain, therefore I will continue the patient on this medication.  Oxycodone/acetaminophen 5/325 mg tablet e-prescribed to the patient's pharmacy with a do not fill until date of 7/10/2025 and 2025.   There are risks associated with opioid medications, including dependence, addiction and tolerance. The patient understands and agrees to use these medications only as prescribed. Potential side effects of the medications include, but are not limited to, constipation, drowsiness, addiction, impaired judgment and risk of fatal overdose if not taken as prescribed. The patient was warned against driving while taking sedation medications.  Sharing medications is a felony. At this point in time, the patient is showing no signs of addiction, abuse, diversion or suicidal ideation.  [place] Prescription Drug Monitoring Program report was reviewed and was appropriate      My impressions and  treatment recommendations were discussed in detail with the patient who verbalized understanding and had no further questions.  Discharge instructions were provided. I personally saw and examined the patient and I agree with the above discussed plan of care.    History of Present Illness {?Quick Links Encounters * My Last Note * Last Note in Specialty * Snapshot * Since Last Visit * History :45536}    Dante Mckinney is a 85 y.o. male who presents for a follow up office visit in regards to Back Pain (Medication is helping with pain, pt endorses he does get periodic shooting pain from the back radiating down the right leg, pain is a 6/10 when this occurs).At today's visit patient states that their pain symptoms are better with a pain score of 0/10 on the verbal numeric pain scale. At night pain is moderate to severe.  The patient's pain is worse at night.  The patient's pain is now intermittent in nature.  And the quality of the patient's pain is described as constant dull arching .  The patient's pain is located in the right low back and down.     Current pain medications includes:  Oxycodone/acetaminophen 5/225 mg tablet patient takes 1 tablet up to twice a day.  The patient reports that this regimen is providing 75% pain relief.  The patient is reporting no side effects from this pain medication regimen.    Opioid contract date: 5/22/25  Last UDS Date: 05/22/25                        Review of Systems   Respiratory:  Negative for shortness of breath.    Cardiovascular:  Negative for chest pain.   Gastrointestinal:  Negative for constipation, diarrhea, nausea and vomiting.   Musculoskeletal:  Positive for arthralgias, back pain and gait problem. Negative for joint swelling and myalgias.   Skin:  Negative for rash.   Neurological:  Negative for dizziness, seizures and weakness.   All other systems reviewed and are negative.      Medical History Reviewed by provider this encounter:  Tobacco  Allergies  Meds  Problems   Med Hx  Surg Hx  Fam Hx     .     Objective {?Quick Links Trend Vitals * Enter New Vitals * Results Review * Timeline (Adult) * Labs * Imaging * Cardiology * Procedures * Lung Cancer Screening * Surgical eConsent :44818}  Ht 6' (1.829 m)   Wt 89.8 kg (198 lb)   BMI 26.85 kg/m²      Pain Score: 0-No pain    Constitutional:normal, well developed, well nourished, alert, in no distress and non-toxic and no overt pain behavior.  Eyes:anicteric  HEENT:grossly intact  Neck:supple, symmetric, trachea midline and no masses   Pulmonary:even and unlabored  Cardiovascular:No edema or pitting edema present  Skin:Normal without rashes or lesions and well hydrated  Psychiatric:Mood and affect appropriate  Neurologic:Cranial Nerves II-XII grossly intact  Musculoskeletal: antalgic gait and ambulates with cane    This document was created using speech voice recognition software.   Grammatical errors, random word insertions, pronoun errors, and incomplete sentences are an occasional consequence of this system due to software limitations, ambient noise, and hardware issues.   Any formal questions or concerns about content, text, or information contained within the body of this dictation should be directly addressed to the provider for clarification.

## 2025-06-19 NOTE — PATIENT INSTRUCTIONS

## 2025-07-07 ENCOUNTER — PATIENT MESSAGE (OUTPATIENT)
Dept: PAIN MEDICINE | Facility: CLINIC | Age: 85
End: 2025-07-07

## 2025-07-07 ENCOUNTER — TELEPHONE (OUTPATIENT)
Age: 85
End: 2025-07-07

## 2025-07-07 DIAGNOSIS — M48.062 SPINAL STENOSIS OF LUMBAR REGION WITH NEUROGENIC CLAUDICATION: Primary | ICD-10-CM

## 2025-07-07 DIAGNOSIS — M54.16 LUMBAR RADICULOPATHY: ICD-10-CM

## 2025-07-07 NOTE — TELEPHONE ENCOUNTER
Caller: Patient    Doctor/Office: Dr Simon    Call regarding :  patient calling to speak with nurse     Call was transferred to: triage nurse

## 2025-07-07 NOTE — TELEPHONE ENCOUNTER
Caller: Dante    Doctor: Dr. Simon    Reason for call: returning schedulers phone call     Call back#: [phone]

## 2025-07-07 NOTE — TELEPHONE ENCOUNTER
S/w pt, he said the Percocet is not helping his R low back and leg pain enough and MG had mentioned another injection. Pt had a Right L5 TFESI and R SIJ in Nov and Dec 2024. RN tried to clarify which injection he was looking to repeat, pt said he did not get much relief from either but MG had recommended a repeat KELSI.

## 2025-07-07 NOTE — PATIENT COMMUNICATION
Pt is scheduled for LESI with Dr Simon on 7/23/25     Pt is not diabetic and reports he does not take prescription blood thinners or aspirin     Pt given instruction review via [employer] message     Have you completed PT/HEP/Chiro in the past 6 months for dedicated area? Per chart, pt has used meds for pain relief - pt has also tried injections for pain relief  If yes, how long did you complete?  What was the frequency?  Did it provide relief?  If no, reason therapy was not completed?

## 2025-07-08 ENCOUNTER — HOSPITAL ENCOUNTER (OUTPATIENT)
Dept: CT IMAGING | Facility: HOSPITAL | Age: 85
Discharge: HOME | End: 2025-07-08
Payer: MEDICARE

## 2025-07-08 DIAGNOSIS — B96.89 ACUTE BACTERIAL SINUSITIS: ICD-10-CM

## 2025-07-08 DIAGNOSIS — J01.90 ACUTE BACTERIAL SINUSITIS: ICD-10-CM

## 2025-07-08 PROCEDURE — 70486 CT MAXILLOFACIAL W/O DYE: CPT

## 2025-07-14 ENCOUNTER — TELEPHONE (OUTPATIENT)
Dept: PAIN MEDICINE | Facility: CLINIC | Age: 85
End: 2025-07-14

## 2025-07-14 DIAGNOSIS — M48.061 SPINAL STENOSIS OF LUMBAR REGION, UNSPECIFIED WHETHER NEUROGENIC CLAUDICATION PRESENT: ICD-10-CM

## 2025-07-14 DIAGNOSIS — M54.16 LUMBAR RADICULOPATHY: ICD-10-CM

## 2025-07-14 RX ORDER — OXYCODONE AND ACETAMINOPHEN 5; 325 MG/1; MG/1
1 TABLET ORAL 2 TIMES DAILY PRN
Qty: 60 TABLET | Refills: 0 | Status: SHIPPED | OUTPATIENT
Start: 2025-07-14

## 2025-07-14 NOTE — TELEPHONE ENCOUNTER
S/w pt to clarify. Per pt, he got 7 day supply from [facility] on 6/9 and [facility] day supply [facility]10  Pt has a few days supply remaining.  Advised pt [facility] will call [facility] as [facility] scripts may have been deleted in error as a duplicate, since they were all sent at the same time.

## 2025-07-14 NOTE — TELEPHONE ENCOUNTER
Advised pt of same. He is aware he will need to cb in August and to give a few days notice before running out.

## 2025-07-14 NOTE — TELEPHONE ENCOUNTER
S/w [facility] rep who states system can not hold duplicate scripts.   Please resend July percocet script

## 2025-07-14 NOTE — TELEPHONE ENCOUNTER
[facility] contacted the [facility] who stated that they never received the Oxycodone script from 07/10/2025. Please advise as I attempted to contact the pharmacy to double check but was on hold for over 10 mins and had to hang up.

## 2025-07-14 NOTE — TELEPHONE ENCOUNTER
Clinical is aware of below. As per epic, receipt was confirmed by pharmacy. As per [facility], pt only received 7 days supply in June  Likely needs auth  Clinical will f/u

## 2025-07-23 ENCOUNTER — HOSPITAL ENCOUNTER (OUTPATIENT)
Dept: RADIOLOGY | Facility: CLINIC | Age: 85
Discharge: HOME | End: 2025-07-23
Payer: MEDICARE

## 2025-07-23 VITALS
OXYGEN SATURATION: 98 % | DIASTOLIC BLOOD PRESSURE: 63 MMHG | RESPIRATION RATE: 20 BRPM | HEART RATE: 88 BPM | SYSTOLIC BLOOD PRESSURE: 116 MMHG | TEMPERATURE: 96.6 F

## 2025-07-23 DIAGNOSIS — M54.16 LUMBAR RADICULOPATHY: ICD-10-CM

## 2025-07-23 DIAGNOSIS — M48.062 SPINAL STENOSIS OF LUMBAR REGION WITH NEUROGENIC CLAUDICATION: ICD-10-CM

## 2025-07-23 PROCEDURE — 62323 NJX INTERLAMINAR LMBR/SAC: CPT | Performed by: STUDENT IN AN ORGANIZED HEALTH CARE EDUCATION/TRAINING PROGRAM

## 2025-07-23 RX ORDER — PAPAVERINE HCL 150 MG
20 CAPSULE, EXTENDED RELEASE ORAL ONCE
Status: COMPLETED | OUTPATIENT
Start: 2025-07-23 | End: 2025-07-23

## 2025-07-23 RX ADMIN — DEXAMETHASONE SODIUM PHOSPHATE 20 MG: 10 INJECTION, SOLUTION INTRAMUSCULAR; INTRAVENOUS at 10:44

## 2025-07-23 RX ADMIN — IOHEXOL 1 ML: 300 INJECTION, SOLUTION INTRAVENOUS at 10:43

## 2025-07-30 NOTE — TELEPHONE ENCOUNTER
Caller: Dante    Doctor: Dr. Wilma LOWERY    Reason for call: pt calling with back pain. Pain level is 9/10. Injections 07/23. Pt is asking for an increase in his oxycodone. Pt states he is not sleeping due to the pain.     Please advise pt     Call back#: [phone]

## 2025-07-30 NOTE — TELEPHONE ENCOUNTER
S/W pt and advised the same  Pt states he is having a hard time at night  Only has to take the Oxy before he goes to bed at around 9 but it doesn't hold him once he lays down.  He's up again at 1030 and takes one Tylenol PM, up again at 1 and takes another. At 3 am takes another Oxy but in between he is up and sitting on the side of the bed unable to sleep due to pain.  He is not even sure the Oxy is doing anything.    States felt great for 24 hours after LESI and by Friday, pain was returning.  States he may just go back to just ES Tylenol and forget the Oxy.  Advised pt to try just the 1000mg Tylenol tonight and see what happens  He is aware of the max limit he can take and that Oxy has Acetaminophen in it as well  Advised we are hopeful the injection will kick in this second week.    CB with questions or concerns

## 2025-07-30 NOTE — TELEPHONE ENCOUNTER
Does he sleep in a bed at night or does he use a recliner?    If he sleeps in the bed what position is he laying in ?    most people with spinal stenosis feel relief when they are in a fetal position laying on their side with their knees tucked up because this stretches the low back.    If he prefers to sleep on his back it may be best for him to get a wedge to stick under his knees to alleviate some pressure in his low back.    Some people have pain relief if they sleep in a recliner.    I will discuss the situation with Dr. Simon and see if there are any other suggestions we can offer.

## 2025-07-30 NOTE — TELEPHONE ENCOUNTER
Can increase oxycodone 5/325-3 times a day dosing.    However if he means that he would like to increase his 5/325 mg tablet to a 7.5/325 mg tablet I would need to discuss that with Dr. Simon.      Unfortunately oxycodone may not reduce his pain no matter what dosage I put it at, there is a real danger of continuously escalating narcotics with only marginal improvement in pain but significant risk of side effects.

## 2025-08-04 ENCOUNTER — HOSPITAL ENCOUNTER (OUTPATIENT)
Dept: CT IMAGING | Facility: HOSPITAL | Age: 85
Discharge: HOME | End: 2025-08-04
Attending: INTERNAL MEDICINE
Payer: MEDICARE

## 2025-08-04 DIAGNOSIS — J43.9 LUNG BULLAE (HCC): ICD-10-CM

## 2025-08-04 PROCEDURE — 71250 CT THORAX DX C-: CPT

## 2025-08-06 ENCOUNTER — TELEPHONE (OUTPATIENT)
Dept: PAIN MEDICINE | Facility: CLINIC | Age: 85
End: 2025-08-06

## 2025-08-07 ENCOUNTER — OFFICE VISIT (OUTPATIENT)
Age: 85
End: 2025-08-07
Payer: MEDICARE

## 2025-08-07 VITALS
HEART RATE: 90 BPM | SYSTOLIC BLOOD PRESSURE: 128 MMHG | WEIGHT: 195 LBS | HEIGHT: 72 IN | DIASTOLIC BLOOD PRESSURE: 76 MMHG | BODY MASS INDEX: 26.41 KG/M2 | RESPIRATION RATE: 18 BRPM | TEMPERATURE: 97.9 F | OXYGEN SATURATION: 94 %

## 2025-08-07 DIAGNOSIS — K59.03 DRUG-INDUCED CONSTIPATION: Primary | ICD-10-CM

## 2025-08-07 DIAGNOSIS — G47.33 OBSTRUCTIVE SLEEP APNEA SYNDROME: ICD-10-CM

## 2025-08-07 DIAGNOSIS — F11.90 CHRONIC, CONTINUOUS USE OF OPIOIDS: ICD-10-CM

## 2025-08-07 DIAGNOSIS — J43.9 LUNG BULLAE (HCC): ICD-10-CM

## 2025-08-07 DIAGNOSIS — K21.9 GASTROESOPHAGEAL REFLUX DISEASE WITHOUT ESOPHAGITIS: ICD-10-CM

## 2025-08-07 PROCEDURE — 99214 OFFICE O/P EST MOD 30 MIN: CPT | Performed by: STUDENT IN AN ORGANIZED HEALTH CARE EDUCATION/TRAINING PROGRAM

## 2025-08-07 PROCEDURE — G2211 COMPLEX E/M VISIT ADD ON: HCPCS | Performed by: STUDENT IN AN ORGANIZED HEALTH CARE EDUCATION/TRAINING PROGRAM

## 2025-08-07 RX ORDER — SENNA AND DOCUSATE SODIUM 50; 8.6 MG/1; MG/1
1 TABLET, FILM COATED ORAL DAILY
Qty: 30 TABLET | Refills: 1 | Status: SHIPPED | OUTPATIENT
Start: 2025-08-07 | End: 2025-08-13 | Stop reason: SDUPTHER

## 2025-08-07 RX ORDER — POLYETHYLENE GLYCOL 3350 17 G/17G
17 POWDER, FOR SOLUTION ORAL DAILY
Qty: 30 EACH | Refills: 1 | Status: SHIPPED | OUTPATIENT
Start: 2025-08-07 | End: 2025-08-13 | Stop reason: SDUPTHER

## 2025-08-11 ENCOUNTER — OFFICE VISIT (OUTPATIENT)
Dept: PAIN MEDICINE | Facility: CLINIC | Age: 85
End: 2025-08-11
Payer: MEDICARE

## 2025-08-13 ENCOUNTER — TELEPHONE (OUTPATIENT)
Dept: PAIN MEDICINE | Facility: CLINIC | Age: 85
End: 2025-08-13

## 2025-08-18 ENCOUNTER — OFFICE VISIT (OUTPATIENT)
Age: 85
End: 2025-08-18
Payer: MEDICARE

## 2025-08-18 VITALS
OXYGEN SATURATION: 98 % | WEIGHT: 193 LBS | DIASTOLIC BLOOD PRESSURE: 64 MMHG | HEART RATE: 80 BPM | TEMPERATURE: 97.9 F | HEIGHT: 72 IN | BODY MASS INDEX: 26.14 KG/M2 | SYSTOLIC BLOOD PRESSURE: 124 MMHG

## 2025-08-18 DIAGNOSIS — G47.33 OBSTRUCTIVE SLEEP APNEA SYNDROME: ICD-10-CM

## 2025-08-18 DIAGNOSIS — J43.9 LUNG BULLAE (HCC): Primary | ICD-10-CM

## 2025-08-18 DIAGNOSIS — J44.9 CHRONIC OBSTRUCTIVE PULMONARY DISEASE, UNSPECIFIED COPD TYPE (HCC): ICD-10-CM

## 2025-08-18 PROCEDURE — G2211 COMPLEX E/M VISIT ADD ON: HCPCS | Performed by: INTERNAL MEDICINE

## 2025-08-18 PROCEDURE — 99214 OFFICE O/P EST MOD 30 MIN: CPT | Performed by: INTERNAL MEDICINE

## 2025-08-21 ENCOUNTER — HOSPITAL ENCOUNTER (OUTPATIENT)
Facility: HOSPITAL | Age: 85
Discharge: HOME | End: 2025-08-21
Attending: INTERNAL MEDICINE
Payer: MEDICARE

## 2025-08-21 DIAGNOSIS — G47.33 OSA (OBSTRUCTIVE SLEEP APNEA): ICD-10-CM

## 2025-08-21 PROCEDURE — 95810 POLYSOM 6/> YRS 4/> PARAM: CPT | Performed by: INTERNAL MEDICINE

## 2025-08-21 PROCEDURE — 95810 POLYSOM 6/> YRS 4/> PARAM: CPT

## 2025-08-25 PROBLEM — G47.61 PLMD (PERIODIC LIMB MOVEMENT DISORDER): Status: ACTIVE | Noted: 2025-08-25

## (undated) DEVICE — TR BAND RADIAL ARTERY COMPRESSION DEVICE: Brand: TR BAND

## (undated) DEVICE — CATH GUIDE LAUNCHER 6FR EBU 3.5

## (undated) DEVICE — DGW .035 FC J3MM 260CM TEF: Brand: EMERALD

## (undated) DEVICE — RADIFOCUS OPTITORQUE ANGIOGRAPHIC CATHETER: Brand: OPTITORQUE

## (undated) DEVICE — Device: Brand: OMNIWIRE PRESSURE GUIDE WIRE

## (undated) DEVICE — GLIDESHEATH SLENDER STAINLESS STEEL KIT: Brand: GLIDESHEATH SLENDER

## (undated) DEVICE — CATH DIAG 5FR IMPULSE 100CM FL3.5